# Patient Record
Sex: MALE | Race: WHITE | Employment: FULL TIME | URBAN - METROPOLITAN AREA
[De-identification: names, ages, dates, MRNs, and addresses within clinical notes are randomized per-mention and may not be internally consistent; named-entity substitution may affect disease eponyms.]

---

## 2017-04-25 ENCOUNTER — HOSPITAL ENCOUNTER (OUTPATIENT)
Age: 47
Discharge: HOME | End: 2017-04-25
Payer: COMMERCIAL

## 2017-04-25 VITALS
WEIGHT: 275 LBS | RESPIRATION RATE: 18 BRPM | SYSTOLIC BLOOD PRESSURE: 139 MMHG | HEIGHT: 71 IN | HEART RATE: 72 BPM | OXYGEN SATURATION: 98 % | TEMPERATURE: 98 F | BODY MASS INDEX: 38.5 KG/M2 | DIASTOLIC BLOOD PRESSURE: 95 MMHG

## 2017-04-25 DIAGNOSIS — H57.9 SENSATION OF FOREIGN BODY IN EYE: Primary | ICD-10-CM

## 2017-04-25 DIAGNOSIS — H10.30 ACUTE CONJUNCTIVITIS, UNSPECIFIED ACUTE CONJUNCTIVITIS TYPE, UNSPECIFIED LATERALITY: ICD-10-CM

## 2017-04-25 PROCEDURE — 99204 OFFICE O/P NEW MOD 45 MIN: CPT

## 2017-04-25 PROCEDURE — 99203 OFFICE O/P NEW LOW 30 MIN: CPT

## 2017-04-25 PROCEDURE — 99213 OFFICE O/P EST LOW 20 MIN: CPT

## 2017-04-25 RX ORDER — TOBRAMYCIN 3 MG/ML
1 SOLUTION/ DROPS OPHTHALMIC EVERY 6 HOURS
Qty: 1 BOTTLE | Refills: 0 | Status: SHIPPED | OUTPATIENT
Start: 2017-04-25 | End: 2017-04-30

## 2017-04-28 NOTE — ED PROVIDER NOTES
Patient presents with: Eye Visual Problem (opthalmic)      HPI:     Dave Corley is a 55year old male who presents today with a chief complaint of pink eye. Onset of symptoms was abrupt starting 2 days ago.    Pain is described as itching, irritation unspecified acute conjunctivitis type, unspecified laterality H10.30     use tobramycin eyedrops as directed. Follow-up with primary care physician as needed.   If has any worsening redness, blurred vision or change in vision recommended to follow-up with

## 2017-05-31 ENCOUNTER — OFFICE VISIT (OUTPATIENT)
Dept: INTERNAL MEDICINE CLINIC | Facility: CLINIC | Age: 47
End: 2017-05-31

## 2017-05-31 VITALS
BODY MASS INDEX: 40.37 KG/M2 | HEIGHT: 70 IN | RESPIRATION RATE: 13 BRPM | HEART RATE: 104 BPM | OXYGEN SATURATION: 95 % | DIASTOLIC BLOOD PRESSURE: 86 MMHG | SYSTOLIC BLOOD PRESSURE: 124 MMHG | WEIGHT: 282 LBS | TEMPERATURE: 98 F

## 2017-05-31 DIAGNOSIS — Z00.00 PREVENTATIVE HEALTH CARE: Primary | ICD-10-CM

## 2017-05-31 PROBLEM — E66.01 MORBID OBESITY (HCC): Status: ACTIVE | Noted: 2017-05-31

## 2017-05-31 PROCEDURE — 99386 PREV VISIT NEW AGE 40-64: CPT | Performed by: INTERNAL MEDICINE

## 2017-05-31 PROCEDURE — 90471 IMMUNIZATION ADMIN: CPT | Performed by: INTERNAL MEDICINE

## 2017-05-31 PROCEDURE — 90715 TDAP VACCINE 7 YRS/> IM: CPT | Performed by: INTERNAL MEDICINE

## 2017-05-31 NOTE — PATIENT INSTRUCTIONS
- Get fasting blood work done (water and medications only for 8 hours). - Your blood pressure was fine today. - We will give you your tetanus shot today, which will cover you for 10 years.         Eating Heart-Healthy Food: Using the [address] [place] [address] Best choices: Skim or 1% milk, low-fat or fat-free yogurt or buttermilk, and low-fat cheeses.         Lean meats, poultry, fish  Servings: 6 or fewer a day  A serving is:  · 1 ounce cooked meats, poultry, or fish  · 1 egg  Best choices: Lean poultry and fi

## 2017-06-03 ENCOUNTER — LAB ENCOUNTER (OUTPATIENT)
Dept: LAB | Facility: HOSPITAL | Age: 47
End: 2017-06-03
Attending: INTERNAL MEDICINE
Payer: COMMERCIAL

## 2017-06-03 DIAGNOSIS — Z00.00 PREVENTATIVE HEALTH CARE: ICD-10-CM

## 2017-06-03 PROCEDURE — 80053 COMPREHEN METABOLIC PANEL: CPT

## 2017-06-03 PROCEDURE — 85025 COMPLETE CBC W/AUTO DIFF WBC: CPT

## 2017-06-03 PROCEDURE — 36415 COLL VENOUS BLD VENIPUNCTURE: CPT

## 2017-06-03 PROCEDURE — 80061 LIPID PANEL: CPT

## 2017-06-03 PROCEDURE — 83036 HEMOGLOBIN GLYCOSYLATED A1C: CPT

## 2017-06-05 PROBLEM — E78.1 HYPERTRIGLYCERIDEMIA: Status: ACTIVE | Noted: 2017-06-05

## 2017-06-05 PROBLEM — R73.03 PREDIABETES: Status: ACTIVE | Noted: 2017-06-05

## 2017-07-26 ENCOUNTER — OFFICE VISIT (OUTPATIENT)
Dept: INTERNAL MEDICINE CLINIC | Facility: CLINIC | Age: 47
End: 2017-07-26

## 2017-07-26 VITALS
DIASTOLIC BLOOD PRESSURE: 80 MMHG | RESPIRATION RATE: 12 BRPM | SYSTOLIC BLOOD PRESSURE: 120 MMHG | WEIGHT: 279 LBS | HEIGHT: 70 IN | TEMPERATURE: 98 F | HEART RATE: 18 BPM | BODY MASS INDEX: 39.94 KG/M2

## 2017-07-26 DIAGNOSIS — R80.0 ISOLATED PROTEINURIA WITHOUT SPECIFIC MORPHOLOGIC LESION: Primary | ICD-10-CM

## 2017-07-26 DIAGNOSIS — G47.33 OBSTRUCTIVE SLEEP APNEA: ICD-10-CM

## 2017-07-26 PROCEDURE — 99213 OFFICE O/P EST LOW 20 MIN: CPT | Performed by: INTERNAL MEDICINE

## 2017-07-26 NOTE — PROGRESS NOTES
Katie Hogan is a 52year old male. HPI:   Patient presents with:  Test Results: need sleep study order  Patient presents to discuss several issues. He had a DOT physical recently. UA showed mild proteinuria.    Additionally, patient requires an well nourished,in no apparent distress  HEENT: atraumatic, PERRLA, EOMI, normal lid and conjunctiva  LUNGS: clear to auscultation bilaterally, no wheezing/rubs  CARDIO: RRR without murmurs. No clubbing, cyanosis or edema.   GI: soft non tender nondistended

## 2017-07-26 NOTE — PATIENT INSTRUCTIONS
- Call to schedule sleep study  - Get urine protein test done when you have time. It was a pleasure seeing you in the clinic today. Thank you for choosing the [facility] office for your healthcare needs.  Please call at [phone]

## 2017-09-15 ENCOUNTER — OFFICE VISIT (OUTPATIENT)
Dept: SLEEP CENTER | Facility: HOSPITAL | Age: 47
End: 2017-09-15
Attending: INTERNAL MEDICINE
Payer: COMMERCIAL

## 2017-09-15 PROCEDURE — 95810 POLYSOM 6/> YRS 4/> PARAM: CPT

## 2017-09-19 NOTE — PROCEDURES
[address] [address],[facility] [address]       Accredited by the [facility] of Sleep Medicine (AASM)    PATIENT'S NAME:        Shaylee Gonzalez PHYSICIAN:   Amaya Villa M.D.   REFERRING PHYSICIAN:   Anabella Mena MD  CHERELLE noted.  The oxygen robbi was 83%. The patient spent 95% of the record with a saturation of greater than 90%. PERIODIC LIMB MOVEMENTS:  PLM index is 10 with a PLM arousal index of 5.     EEG:  With the limited recording montage, no EEG abnormalities were

## 2017-09-22 ENCOUNTER — OFFICE VISIT (OUTPATIENT)
Dept: INTERNAL MEDICINE CLINIC | Facility: CLINIC | Age: 47
End: 2017-09-22

## 2017-09-22 VITALS
OXYGEN SATURATION: 93 % | DIASTOLIC BLOOD PRESSURE: 84 MMHG | TEMPERATURE: 99 F | HEIGHT: 70 IN | SYSTOLIC BLOOD PRESSURE: 134 MMHG | WEIGHT: 284 LBS | BODY MASS INDEX: 40.66 KG/M2 | HEART RATE: 86 BPM | RESPIRATION RATE: 12 BRPM

## 2017-09-22 DIAGNOSIS — E78.1 HYPERTRIGLYCERIDEMIA: ICD-10-CM

## 2017-09-22 DIAGNOSIS — R73.03 PREDIABETES: ICD-10-CM

## 2017-09-22 DIAGNOSIS — G47.33 OBSTRUCTIVE SLEEP APNEA: Primary | ICD-10-CM

## 2017-09-22 DIAGNOSIS — E66.01 MORBID OBESITY (HCC): ICD-10-CM

## 2017-09-22 PROCEDURE — 90686 IIV4 VACC NO PRSV 0.5 ML IM: CPT | Performed by: INTERNAL MEDICINE

## 2017-09-22 PROCEDURE — 99214 OFFICE O/P EST MOD 30 MIN: CPT | Performed by: INTERNAL MEDICINE

## 2017-09-22 PROCEDURE — 90471 IMMUNIZATION ADMIN: CPT | Performed by: INTERNAL MEDICINE

## 2017-09-22 RX ORDER — BENZONATATE 200 MG/1
200 CAPSULE ORAL 3 TIMES DAILY PRN
Qty: 30 CAPSULE | Refills: 1 | Status: SHIPPED | OUTPATIENT
Start: 2017-09-22 | End: 2017-11-26

## 2017-09-22 NOTE — PATIENT INSTRUCTIONS
- Check with Dr. Kiki Taylor office about whether they need to see you sooner for paperwork  - Start nightly allergy medication.   Options are cetirizine, loratadine, or fexofenadine.  - Start twice daily nasal sprays such as fluticasone, mometasone, Flonase

## 2017-09-23 PROBLEM — G47.33 OBSTRUCTIVE SLEEP APNEA: Status: ACTIVE | Noted: 2017-09-23

## 2017-11-02 ENCOUNTER — TELEPHONE (OUTPATIENT)
Dept: INTERNAL MEDICINE CLINIC | Facility: CLINIC | Age: 47
End: 2017-11-02

## 2017-11-02 NOTE — TELEPHONE ENCOUNTER
Patient called to reschedule appointment on same day. Patient was informed that it will be a no show appointment and was also explained no show policy. Patient understood policy.

## 2017-11-06 ENCOUNTER — OFFICE VISIT (OUTPATIENT)
Dept: INTERNAL MEDICINE CLINIC | Facility: CLINIC | Age: 47
End: 2017-11-06

## 2017-11-06 VITALS
OXYGEN SATURATION: 98 % | HEIGHT: 71 IN | RESPIRATION RATE: 16 BRPM | DIASTOLIC BLOOD PRESSURE: 72 MMHG | WEIGHT: 292.5 LBS | SYSTOLIC BLOOD PRESSURE: 122 MMHG | HEART RATE: 72 BPM | TEMPERATURE: 98 F | BODY MASS INDEX: 40.95 KG/M2

## 2017-11-06 DIAGNOSIS — E78.1 HYPERTRIGLYCERIDEMIA: ICD-10-CM

## 2017-11-06 DIAGNOSIS — G47.33 OBSTRUCTIVE SLEEP APNEA: ICD-10-CM

## 2017-11-06 DIAGNOSIS — E66.01 MORBID OBESITY (HCC): ICD-10-CM

## 2017-11-06 DIAGNOSIS — R73.03 PREDIABETES: ICD-10-CM

## 2017-11-06 DIAGNOSIS — R05.3 PERSISTENT COUGH: Primary | ICD-10-CM

## 2017-11-06 PROCEDURE — 99214 OFFICE O/P EST MOD 30 MIN: CPT | Performed by: INTERNAL MEDICINE

## 2017-11-06 RX ORDER — CODEINE PHOSPHATE AND GUAIFENESIN 10; 100 MG/5ML; MG/5ML
5 SOLUTION ORAL NIGHTLY PRN
Qty: 118 ML | Refills: 0 | Status: SHIPPED | OUTPATIENT
Start: 2017-11-06 | End: 2017-11-26

## 2017-11-06 NOTE — PATIENT INSTRUCTIONS
- Use codeine cough syrup (at night only). - Continue benzonatate during the day  - Also use steroid nasal sprays such as fluticasone, mometasone, Flonase, Nasocort, Nasonex, Rhinocort.   - Consider OTC allergy medications - loratadine, cetirizine, fexofen

## 2017-11-07 NOTE — PROGRESS NOTES
Kacey Mcqueen is a 52year old male. HPI:   Patient presents with:  Cough: x1 month, little phlegm white  Patient presents for follow up on persistent cough. Has been going on for past two months.   Tried OTC antihistamines and steroid nasal sprays, 282 lb  09/22/17 : 284 lb  07/26/17 : 279 lb  05/31/17 : 282 lb  04/25/17 : 275 lb    EXAM:   /72 (BP Location: Left arm, Patient Position: Sitting, Cuff Size: large)   Pulse 72   Temp 98.1 °F (36.7 °C) (Oral)   Resp 16   Ht 71\"   Wt 292 lb 8 oz   S earlier if acute issues arise.     Anabella Mena MD

## 2017-11-28 RX ORDER — BENZONATATE 200 MG/1
200 CAPSULE ORAL 3 TIMES DAILY PRN
Qty: 30 CAPSULE | Refills: 1 | Status: SHIPPED
Start: 2017-11-28 | End: 2018-11-29 | Stop reason: ALTCHOICE

## 2017-11-28 NOTE — TELEPHONE ENCOUNTER
From: Jeff Lacey  Sent: 11/26/2017 8:55 PM CST  Subject: Medication Renewal Request    Jeff Lacey would like a refill of the following medications:     guaiFENesin-codeine 100-10 MG/5ML Oral Solution Aníbal Olsen MD]    Preferred pharmacy:

## 2017-11-28 NOTE — TELEPHONE ENCOUNTER
From: Tio Maddox  Sent: 11/26/2017 8:55 PM CST  Subject: Medication Renewal Request    Tio Maddox would like a refill of the following medications:     benzonatate 200 MG Oral Cap Frida Moralez MD]    Preferred pharmacy: [place] [id number]

## 2017-11-29 RX ORDER — CODEINE PHOSPHATE AND GUAIFENESIN 10; 100 MG/5ML; MG/5ML
5 SOLUTION ORAL NIGHTLY PRN
Qty: 118 ML | Refills: 0
Start: 2017-11-29

## 2017-11-29 RX ORDER — CODEINE PHOSPHATE AND GUAIFENESIN 10; 100 MG/5ML; MG/5ML
5 SOLUTION ORAL NIGHTLY PRN
Qty: 118 ML | Refills: 0 | Status: SHIPPED | OUTPATIENT
Start: 2017-11-29 | End: 2018-11-29 | Stop reason: ALTCHOICE

## 2017-11-29 RX ORDER — CODEINE PHOSPHATE AND GUAIFENESIN 10; 100 MG/5ML; MG/5ML
5 SOLUTION ORAL NIGHTLY PRN
Qty: 118 ML | Refills: 0
Start: 2017-11-29 | End: 2017-11-29

## 2017-11-29 RX ORDER — BENZONATATE 200 MG/1
200 CAPSULE ORAL 3 TIMES DAILY PRN
Qty: 30 CAPSULE | Refills: 1
Start: 2017-11-29

## 2018-11-29 ENCOUNTER — OFFICE VISIT (OUTPATIENT)
Dept: INTERNAL MEDICINE CLINIC | Facility: CLINIC | Age: 48
End: 2018-11-29
Payer: COMMERCIAL

## 2018-11-29 VITALS
RESPIRATION RATE: 16 BRPM | DIASTOLIC BLOOD PRESSURE: 78 MMHG | HEIGHT: 70 IN | TEMPERATURE: 98 F | BODY MASS INDEX: 41.95 KG/M2 | HEART RATE: 77 BPM | WEIGHT: 293 LBS | OXYGEN SATURATION: 97 % | SYSTOLIC BLOOD PRESSURE: 130 MMHG

## 2018-11-29 DIAGNOSIS — M25.561 ACUTE PAIN OF RIGHT KNEE: Primary | ICD-10-CM

## 2018-11-29 DIAGNOSIS — Z23 NEED FOR IMMUNIZATION AGAINST INFLUENZA: ICD-10-CM

## 2018-11-29 PROCEDURE — 90686 IIV4 VACC NO PRSV 0.5 ML IM: CPT | Performed by: INTERNAL MEDICINE

## 2018-11-29 PROCEDURE — 99214 OFFICE O/P EST MOD 30 MIN: CPT | Performed by: INTERNAL MEDICINE

## 2018-11-29 PROCEDURE — 90471 IMMUNIZATION ADMIN: CPT | Performed by: INTERNAL MEDICINE

## 2018-11-29 NOTE — PROGRESS NOTES
Samanta Tay is a 50year old male. HPI:   Patient presents with:  Knee Pain: right knee pain - pt concerned may be gout related  Patient presents with an acute musculoskeletal complaint. Patient has been dealing with pain in right knee.   It has bee Position: Sitting, Cuff Size: adult)   Pulse 77   Temp 98.4 °F (36.9 °C) (Oral)   Resp 16   Ht 70\"   Wt 293 lb   SpO2 97%   BMI 42.04 kg/m²   GENERAL: Alert and oriented, well developed, well nourished,in no apparent distress  HEENT: atraumatic, MARIA DEL CARMEN CORNEJO

## 2018-11-29 NOTE — PATIENT INSTRUCTIONS
- Start diclofenac (prescription anti-inflammatory). Take 1 tablet every 8 hours as needed. Take with food. Do not take any other over the counter anti-inflammatories while you are taking diclofenac.   Ok to take Tylenol.  - Follow up with me soon for yo

## 2018-12-13 ENCOUNTER — LAB ENCOUNTER (OUTPATIENT)
Dept: LAB | Age: 48
End: 2018-12-13
Attending: INTERNAL MEDICINE
Payer: COMMERCIAL

## 2018-12-13 ENCOUNTER — OFFICE VISIT (OUTPATIENT)
Dept: INTERNAL MEDICINE CLINIC | Facility: CLINIC | Age: 48
End: 2018-12-13
Payer: COMMERCIAL

## 2018-12-13 VITALS
TEMPERATURE: 98 F | BODY MASS INDEX: 41.77 KG/M2 | RESPIRATION RATE: 16 BRPM | WEIGHT: 291.75 LBS | HEIGHT: 70 IN | HEART RATE: 68 BPM | SYSTOLIC BLOOD PRESSURE: 134 MMHG | DIASTOLIC BLOOD PRESSURE: 80 MMHG

## 2018-12-13 DIAGNOSIS — M25.561 ACUTE PAIN OF RIGHT KNEE: ICD-10-CM

## 2018-12-13 DIAGNOSIS — Z00.00 ENCOUNTER FOR SCREENING AND PREVENTATIVE CARE: ICD-10-CM

## 2018-12-13 DIAGNOSIS — Z00.00 ENCOUNTER FOR SCREENING AND PREVENTATIVE CARE: Primary | ICD-10-CM

## 2018-12-13 PROCEDURE — 84153 ASSAY OF PSA TOTAL: CPT | Performed by: INTERNAL MEDICINE

## 2018-12-13 PROCEDURE — 80050 GENERAL HEALTH PANEL: CPT | Performed by: INTERNAL MEDICINE

## 2018-12-13 PROCEDURE — 83036 HEMOGLOBIN GLYCOSYLATED A1C: CPT | Performed by: INTERNAL MEDICINE

## 2018-12-13 PROCEDURE — 80061 LIPID PANEL: CPT | Performed by: INTERNAL MEDICINE

## 2018-12-13 PROCEDURE — 99396 PREV VISIT EST AGE 40-64: CPT | Performed by: INTERNAL MEDICINE

## 2018-12-13 PROCEDURE — 36415 COLL VENOUS BLD VENIPUNCTURE: CPT | Performed by: INTERNAL MEDICINE

## 2018-12-13 NOTE — PROGRESS NOTES
Tio Maddox is a 50year old male. HPI:   Patient presents with:  Physical: Fasting   Knee Pain: Follow up  Patient presents for CPX/wellness examination. Diet: Not the healthiest  Exercise: Not a lot. A lot of physical activity at work.     Vision oz  11/29/18 : 293 lb  11/06/17 : 292 lb 8 oz  10/04/17 : 282 lb  09/22/17 : 284 lb  07/26/17 : 279 lb    EXAM:   /80 (BP Location: Left arm, Patient Position: Sitting, Cuff Size: adult)   Pulse 68   Temp 98.2 °F (36.8 °C) (Oral)   Resp 16   Ht 70\"

## 2018-12-13 NOTE — PATIENT INSTRUCTIONS
- Get blood work done today  - Continue with the diclofenac for the knee pain.   If you need a refill on the diclofenac, send a request on [employer].   - Start knee exercises as shown on handout  - If your knee pain is not better within a month or so, come ba

## 2018-12-14 DIAGNOSIS — E78.2 MIXED HYPERLIPIDEMIA: Primary | ICD-10-CM

## 2018-12-14 DIAGNOSIS — R73.03 PREDIABETES: ICD-10-CM

## 2019-01-16 ENCOUNTER — PATIENT MESSAGE (OUTPATIENT)
Dept: INTERNAL MEDICINE CLINIC | Facility: CLINIC | Age: 49
End: 2019-01-16

## 2019-01-16 ENCOUNTER — OFFICE VISIT (OUTPATIENT)
Dept: INTERNAL MEDICINE CLINIC | Facility: CLINIC | Age: 49
End: 2019-01-16
Payer: COMMERCIAL

## 2019-01-16 VITALS
HEIGHT: 70 IN | HEART RATE: 60 BPM | TEMPERATURE: 99 F | WEIGHT: 297 LBS | RESPIRATION RATE: 12 BRPM | SYSTOLIC BLOOD PRESSURE: 150 MMHG | DIASTOLIC BLOOD PRESSURE: 96 MMHG | BODY MASS INDEX: 42.52 KG/M2

## 2019-01-16 DIAGNOSIS — E78.2 MIXED HYPERLIPIDEMIA: ICD-10-CM

## 2019-01-16 DIAGNOSIS — G47.33 OBSTRUCTIVE SLEEP APNEA: ICD-10-CM

## 2019-01-16 DIAGNOSIS — R09.81 NASAL CONGESTION: ICD-10-CM

## 2019-01-16 DIAGNOSIS — M25.50 CHRONIC JOINT PAIN: ICD-10-CM

## 2019-01-16 DIAGNOSIS — G89.29 CHRONIC JOINT PAIN: ICD-10-CM

## 2019-01-16 DIAGNOSIS — R73.03 PREDIABETES: ICD-10-CM

## 2019-01-16 DIAGNOSIS — M25.512 ACUTE PAIN OF LEFT SHOULDER: ICD-10-CM

## 2019-01-16 DIAGNOSIS — R06.02 SHORTNESS OF BREATH: Primary | ICD-10-CM

## 2019-01-16 PROCEDURE — 99214 OFFICE O/P EST MOD 30 MIN: CPT | Performed by: INTERNAL MEDICINE

## 2019-01-16 NOTE — PATIENT INSTRUCTIONS
- Start nightly allergy medication. Over the counter options are cetirizine, loratadine, fexofenadine.  - Also use steroid nasal sprays such as fluticasone, mometasone, Flonase, Nasocort, Nasonex, Rhinocort.   Use twice daily for the next week  - Let us kn

## 2019-01-16 NOTE — TELEPHONE ENCOUNTER
From: Nereyda Veronica  To: Aliyah Campbell MD  Sent: 1/16/2019 10:28 AM CST  Subject: Other    Hey Doc,  I had some shortness of breath last night around 2:00 am, couldn't get a full breath in. Don't know if it's anything serious but little concerned.  Ar

## 2019-01-16 NOTE — TELEPHONE ENCOUNTER
Pt contacted and  requesting appointment today with Dr Wade Crowell. Pt c/o episode of  sob and unable to take a deep breath at 2 am this morning. No c/o cough, wheezing or acute illness. Pt better this morning but still feels alittle short of breath.   Can y

## 2019-01-16 NOTE — PROGRESS NOTES
Andrew Kirby is a 50year old male. HPI:   Patient presents with:  Shortness Of Breath: Pt woke up last night with shortness of  breath. States he also had some left shoulder pain.    Shoulder Pain: Left side   Patient presents   Woke up last jc wi alcohol. He reports that he does not use drugs.   Wt Readings from Last 6 Encounters:  01/16/19 : 297 lb  12/13/18 : 291 lb 12 oz  11/29/18 : 293 lb  11/06/17 : 292 lb 8 oz  10/04/17 : 282 lb  09/22/17 : 284 lb    EXAM:   BP (!) 150/96 (BP Location: Left ar

## 2019-06-12 PROCEDURE — 87086 URINE CULTURE/COLONY COUNT: CPT | Performed by: EMERGENCY MEDICINE

## 2019-06-14 ENCOUNTER — OFFICE VISIT (OUTPATIENT)
Dept: INTERNAL MEDICINE CLINIC | Facility: CLINIC | Age: 49
End: 2019-06-14
Payer: COMMERCIAL

## 2019-06-14 ENCOUNTER — APPOINTMENT (OUTPATIENT)
Dept: LAB | Age: 49
End: 2019-06-14
Attending: INTERNAL MEDICINE
Payer: COMMERCIAL

## 2019-06-14 VITALS
RESPIRATION RATE: 24 BRPM | HEART RATE: 80 BPM | TEMPERATURE: 99 F | BODY MASS INDEX: 42 KG/M2 | WEIGHT: 291 LBS | DIASTOLIC BLOOD PRESSURE: 86 MMHG | SYSTOLIC BLOOD PRESSURE: 120 MMHG

## 2019-06-14 DIAGNOSIS — R73.03 PREDIABETES: Chronic | ICD-10-CM

## 2019-06-14 DIAGNOSIS — E78.2 MIXED HYPERLIPIDEMIA: Chronic | ICD-10-CM

## 2019-06-14 DIAGNOSIS — R09.81 NASAL CONGESTION: ICD-10-CM

## 2019-06-14 DIAGNOSIS — R73.03 PREDIABETES: ICD-10-CM

## 2019-06-14 DIAGNOSIS — K57.32 SIGMOID DIVERTICULITIS: Primary | ICD-10-CM

## 2019-06-14 DIAGNOSIS — G47.33 OBSTRUCTIVE SLEEP APNEA: Chronic | ICD-10-CM

## 2019-06-14 PROCEDURE — 99214 OFFICE O/P EST MOD 30 MIN: CPT | Performed by: INTERNAL MEDICINE

## 2019-06-14 PROCEDURE — 36415 COLL VENOUS BLD VENIPUNCTURE: CPT | Performed by: INTERNAL MEDICINE

## 2019-06-14 PROCEDURE — 83036 HEMOGLOBIN GLYCOSYLATED A1C: CPT | Performed by: INTERNAL MEDICINE

## 2019-06-14 RX ORDER — TRAMADOL HYDROCHLORIDE 50 MG/1
50 TABLET ORAL EVERY 8 HOURS PRN
Qty: 30 TABLET | Refills: 0 | Status: SHIPPED | OUTPATIENT
Start: 2019-06-14 | End: 2020-09-28

## 2019-06-14 NOTE — PROGRESS NOTES
Donn Lyles is a 52year old male. HPI:   Patient presents with: Follow - Up: per pt A1C blood draw done today  Headache: frontal  Stuffy Nose    Patient presents to discuss several issues.   He was recently at the immediate care with complaints of (2010) and tonsillectomy (2010). Family: family history includes Cancer in his mother; Diabetes in his father; Hypertension in his father. Social:  reports that he quit smoking about 21 years ago. His smoking use included cigarettes and cigars.  He has ne General (Internal Medicine)  The patient indicates understanding of these issues and agrees to the plan. The patient is asked to return to clinic in 6-12 months with myself for follow up on chronic issues, or earlier if acute issues arise.     Tara Fan

## 2019-06-14 NOTE — PATIENT INSTRUCTIONS
- Slowly advance diet as tolerated  - Take tramadol every 8 hours as needed for pain  - Off work until next Saturday  - Call or go to immediate care/emergency department with any fevers/chills, worsening abdominal pain, nausea/vomiting, blood in stool.

## 2019-06-18 PROBLEM — G47.33 OBSTRUCTIVE SLEEP APNEA: Chronic | Status: ACTIVE | Noted: 2017-09-23

## 2019-06-18 PROBLEM — E78.2 MIXED HYPERLIPIDEMIA: Chronic | Status: ACTIVE | Noted: 2017-06-05

## 2019-06-18 PROBLEM — R73.03 PREDIABETES: Chronic | Status: ACTIVE | Noted: 2017-06-05

## 2019-06-18 PROBLEM — K57.32 SIGMOID DIVERTICULITIS: Status: ACTIVE | Noted: 2019-06-18

## 2020-01-25 RX ORDER — TRAMADOL HYDROCHLORIDE 50 MG/1
50 TABLET ORAL EVERY 8 HOURS PRN
Qty: 30 TABLET | Refills: 0 | Status: CANCELLED | OUTPATIENT
Start: 2020-01-25

## 2020-01-27 RX ORDER — TRAMADOL HYDROCHLORIDE 50 MG/1
50 TABLET ORAL EVERY 8 HOURS PRN
Qty: 30 TABLET | Refills: 0 | Status: CANCELLED | OUTPATIENT
Start: 2020-01-27

## 2020-01-27 NOTE — TELEPHONE ENCOUNTER
Failed protocol     Last refill:  6/14/2019 Tramadol 50 mg #30 tablets NR    LOV:   6/14/2019 Dr Xiao Rossi RTC 6-12 months  No FOV scheduled

## 2020-01-27 NOTE — TELEPHONE ENCOUNTER
Looks like I prescribed this in the summer for his diverticulitis related pain/groin pain - is he still having this pain?

## 2020-02-20 ENCOUNTER — APPOINTMENT (OUTPATIENT)
Dept: LAB | Age: 50
End: 2020-02-20
Attending: INTERNAL MEDICINE
Payer: COMMERCIAL

## 2020-02-20 ENCOUNTER — OFFICE VISIT (OUTPATIENT)
Dept: INTERNAL MEDICINE CLINIC | Facility: CLINIC | Age: 50
End: 2020-02-20
Payer: COMMERCIAL

## 2020-02-20 VITALS
TEMPERATURE: 98 F | SYSTOLIC BLOOD PRESSURE: 130 MMHG | RESPIRATION RATE: 16 BRPM | OXYGEN SATURATION: 98 % | HEART RATE: 88 BPM | HEIGHT: 70 IN | DIASTOLIC BLOOD PRESSURE: 70 MMHG | BODY MASS INDEX: 42.66 KG/M2 | WEIGHT: 298 LBS

## 2020-02-20 DIAGNOSIS — Z12.5 SCREENING FOR MALIGNANT NEOPLASM OF PROSTATE: ICD-10-CM

## 2020-02-20 DIAGNOSIS — R73.03 PREDIABETES: Chronic | ICD-10-CM

## 2020-02-20 DIAGNOSIS — J40 BRONCHITIS: ICD-10-CM

## 2020-02-20 DIAGNOSIS — Z12.11 SCREENING FOR MALIGNANT NEOPLASM OF COLON: ICD-10-CM

## 2020-02-20 DIAGNOSIS — R74.8 ELEVATED LIVER ENZYMES: ICD-10-CM

## 2020-02-20 DIAGNOSIS — E66.01 MORBID OBESITY (HCC): ICD-10-CM

## 2020-02-20 DIAGNOSIS — Z00.00 ENCOUNTER FOR PREVENTATIVE ADULT HEALTH CARE EXAMINATION: Primary | ICD-10-CM

## 2020-02-20 DIAGNOSIS — Z00.00 ENCOUNTER FOR PREVENTATIVE ADULT HEALTH CARE EXAMINATION: ICD-10-CM

## 2020-02-20 DIAGNOSIS — G47.33 OBSTRUCTIVE SLEEP APNEA: Chronic | ICD-10-CM

## 2020-02-20 DIAGNOSIS — E78.2 MIXED HYPERLIPIDEMIA: Chronic | ICD-10-CM

## 2020-02-20 LAB
ALBUMIN SERPL-MCNC: 3.8 G/DL (ref 3.4–5)
ALBUMIN/GLOB SERPL: 0.9 {RATIO} (ref 1–2)
ALP LIVER SERPL-CCNC: 64 U/L (ref 45–117)
ALT SERPL-CCNC: 79 U/L (ref 16–61)
ANION GAP SERPL CALC-SCNC: 5 MMOL/L (ref 0–18)
AST SERPL-CCNC: 38 U/L (ref 15–37)
BILIRUB SERPL-MCNC: 0.8 MG/DL (ref 0.1–2)
BUN BLD-MCNC: 13 MG/DL (ref 7–18)
BUN/CREAT SERPL: 13 (ref 10–20)
CALCIUM BLD-MCNC: 9.3 MG/DL (ref 8.5–10.1)
CHLORIDE SERPL-SCNC: 105 MMOL/L (ref 98–112)
CHOLEST SMN-MCNC: 188 MG/DL (ref ?–200)
CO2 SERPL-SCNC: 27 MMOL/L (ref 21–32)
COMPLEXED PSA SERPL-MCNC: 0.69 NG/ML (ref ?–4)
CREAT BLD-MCNC: 1 MG/DL (ref 0.7–1.3)
EST. AVERAGE GLUCOSE BLD GHB EST-MCNC: 140 MG/DL (ref 68–126)
GLOBULIN PLAS-MCNC: 4.1 G/DL (ref 2.8–4.4)
GLUCOSE BLD-MCNC: 98 MG/DL (ref 70–99)
HBA1C MFR BLD HPLC: 6.5 % (ref ?–5.7)
HDLC SERPL-MCNC: 38 MG/DL (ref 40–59)
LDLC SERPL CALC-MCNC: 113 MG/DL (ref ?–100)
M PROTEIN MFR SERPL ELPH: 7.9 G/DL (ref 6.4–8.2)
NONHDLC SERPL-MCNC: 150 MG/DL (ref ?–130)
OSMOLALITY SERPL CALC.SUM OF ELEC: 284 MOSM/KG (ref 275–295)
PATIENT FASTING Y/N/NP: YES
PATIENT FASTING Y/N/NP: YES
POTASSIUM SERPL-SCNC: 4.4 MMOL/L (ref 3.5–5.1)
SODIUM SERPL-SCNC: 137 MMOL/L (ref 136–145)
TRIGL SERPL-MCNC: 184 MG/DL (ref 30–149)
TSI SER-ACNC: 1.99 MIU/ML (ref 0.36–3.74)
VLDLC SERPL CALC-MCNC: 37 MG/DL (ref 0–30)

## 2020-02-20 PROCEDURE — 80061 LIPID PANEL: CPT | Performed by: INTERNAL MEDICINE

## 2020-02-20 PROCEDURE — 80053 COMPREHEN METABOLIC PANEL: CPT | Performed by: INTERNAL MEDICINE

## 2020-02-20 PROCEDURE — 83036 HEMOGLOBIN GLYCOSYLATED A1C: CPT | Performed by: INTERNAL MEDICINE

## 2020-02-20 PROCEDURE — 84153 ASSAY OF PSA TOTAL: CPT | Performed by: INTERNAL MEDICINE

## 2020-02-20 PROCEDURE — 36415 COLL VENOUS BLD VENIPUNCTURE: CPT | Performed by: INTERNAL MEDICINE

## 2020-02-20 PROCEDURE — 99396 PREV VISIT EST AGE 40-64: CPT | Performed by: INTERNAL MEDICINE

## 2020-02-20 PROCEDURE — 84443 ASSAY THYROID STIM HORMONE: CPT | Performed by: INTERNAL MEDICINE

## 2020-02-20 RX ORDER — BENZONATATE 100 MG/1
100 CAPSULE ORAL 3 TIMES DAILY PRN
Qty: 30 CAPSULE | Refills: 0 | Status: SHIPPED | OUTPATIENT
Start: 2020-02-20 | End: 2020-03-07

## 2020-02-20 NOTE — PATIENT INSTRUCTIONS
- Get blood work done today  - Continue focus on healthy diet and regular exercise  - Benzonatate capsules refilled.   Take 1 capsule 3 times daily as needed for cough  - You can stop the inhaler (as long as you are not having significant shortness of breat

## 2020-02-20 NOTE — PROGRESS NOTES
Tio Maddox is a 52year old male. HPI:   Patient presents with:  CPX    Patient presents for CPX/wellness examination. Diet: Cut down on sugar, soda. Exercise: Patient has been laid off for   Vision: Wears contacts.   Last eye exam was about a ye Obstructive apnea (09/15/2017) and Sigmoid diverticulitis (6/18/2019). Surgical:  has a past surgical history that includes adenoidectomy (2010) and tonsillectomy (2010).   Family: family history includes Cancer in his mother; Diabetes in his father; Hyper (14); Future  - HEMOGLOBIN A1C; Future  - LIPID PANEL; Future  - TSH W REFLEX TO FREE T4; Future  - PSA SCREEN; Future  - GASTRO - INTERNAL    4.  Bronchitis  Patient seen a week ago at a walk-in clinic - he was prescribed albuterol inhaler, benzonatate, pr

## 2020-02-21 DIAGNOSIS — R74.8 ELEVATED LIVER ENZYMES: ICD-10-CM

## 2020-02-21 DIAGNOSIS — R73.03 PREDIABETES: Chronic | ICD-10-CM

## 2020-02-21 DIAGNOSIS — E78.2 MIXED HYPERLIPIDEMIA: Primary | Chronic | ICD-10-CM

## 2020-02-23 PROBLEM — K57.32 SIGMOID DIVERTICULITIS: Status: RESOLVED | Noted: 2019-06-18 | Resolved: 2020-02-23

## 2020-02-27 DIAGNOSIS — J40 BRONCHITIS: ICD-10-CM

## 2020-02-28 RX ORDER — BENZONATATE 100 MG/1
100 CAPSULE ORAL 3 TIMES DAILY PRN
Qty: 30 CAPSULE | Refills: 0 | OUTPATIENT
Start: 2020-02-28

## 2020-03-07 DIAGNOSIS — J40 BRONCHITIS: ICD-10-CM

## 2020-03-09 DIAGNOSIS — R05.3 PERSISTENT COUGH FOR 3 WEEKS OR LONGER: Primary | ICD-10-CM

## 2020-03-09 RX ORDER — BENZONATATE 100 MG/1
100 CAPSULE ORAL 3 TIMES DAILY PRN
Qty: 30 CAPSULE | Refills: 0 | Status: SHIPPED | OUTPATIENT
Start: 2020-03-09 | End: 2020-09-28 | Stop reason: ALTCHOICE

## 2020-03-09 NOTE — TELEPHONE ENCOUNTER
Failed protocol     Last refill:  2/20/2020 Benzonatate 100 mg #30 NR    LOV:   2/20/2020 Dr Vonda Sanchez RTC 6-12 months  4. Bronchitis  Patient seen a week ago at a walk-in clinic - he was prescribed albuterol inhaler, benzonatate, prednisone.   Feeling better

## 2020-03-23 ENCOUNTER — HOSPITAL ENCOUNTER (OUTPATIENT)
Dept: GENERAL RADIOLOGY | Age: 50
Discharge: HOME | End: 2020-03-23
Attending: INTERNAL MEDICINE
Payer: COMMERCIAL

## 2020-03-23 DIAGNOSIS — R05.3 PERSISTENT COUGH FOR 3 WEEKS OR LONGER: ICD-10-CM

## 2020-03-23 PROCEDURE — 71046 X-RAY EXAM CHEST 2 VIEWS: CPT | Performed by: INTERNAL MEDICINE

## 2020-04-08 DIAGNOSIS — M25.50 CHRONIC JOINT PAIN: ICD-10-CM

## 2020-04-08 DIAGNOSIS — G89.29 CHRONIC JOINT PAIN: ICD-10-CM

## 2020-04-09 NOTE — TELEPHONE ENCOUNTER
Diclofenac 50 mg  Last OV relevant to medication: 2-20-20  Last refill date: 1-16-19 #/refills: 2  When pt was asked to return for OV: 6-12 mo.    Upcoming appt/reason: none  Recent labs: none

## 2020-09-28 ENCOUNTER — OFFICE VISIT (OUTPATIENT)
Dept: INTERNAL MEDICINE CLINIC | Facility: CLINIC | Age: 50
End: 2020-09-28
Payer: COMMERCIAL

## 2020-09-28 VITALS
TEMPERATURE: 98 F | HEIGHT: 70 IN | WEIGHT: 282.19 LBS | BODY MASS INDEX: 40.4 KG/M2 | HEART RATE: 60 BPM | SYSTOLIC BLOOD PRESSURE: 130 MMHG | DIASTOLIC BLOOD PRESSURE: 74 MMHG | RESPIRATION RATE: 16 BRPM

## 2020-09-28 DIAGNOSIS — G89.29 CHRONIC BILATERAL LOW BACK PAIN WITHOUT SCIATICA: ICD-10-CM

## 2020-09-28 DIAGNOSIS — R74.8 ELEVATED LIVER ENZYMES: ICD-10-CM

## 2020-09-28 DIAGNOSIS — G47.33 OBSTRUCTIVE SLEEP APNEA: Chronic | ICD-10-CM

## 2020-09-28 DIAGNOSIS — Z23 NEED FOR IMMUNIZATION AGAINST INFLUENZA: ICD-10-CM

## 2020-09-28 DIAGNOSIS — E78.2 MIXED HYPERLIPIDEMIA: Chronic | ICD-10-CM

## 2020-09-28 DIAGNOSIS — M54.50 CHRONIC BILATERAL LOW BACK PAIN WITHOUT SCIATICA: ICD-10-CM

## 2020-09-28 DIAGNOSIS — R03.0 ELEVATED BLOOD PRESSURE READING: Primary | ICD-10-CM

## 2020-09-28 DIAGNOSIS — R73.03 PREDIABETES: Chronic | ICD-10-CM

## 2020-09-28 PROCEDURE — 3008F BODY MASS INDEX DOCD: CPT | Performed by: INTERNAL MEDICINE

## 2020-09-28 PROCEDURE — 99214 OFFICE O/P EST MOD 30 MIN: CPT | Performed by: INTERNAL MEDICINE

## 2020-09-28 PROCEDURE — 3078F DIAST BP <80 MM HG: CPT | Performed by: INTERNAL MEDICINE

## 2020-09-28 PROCEDURE — 3075F SYST BP GE 130 - 139MM HG: CPT | Performed by: INTERNAL MEDICINE

## 2020-09-28 PROCEDURE — 90686 IIV4 VACC NO PRSV 0.5 ML IM: CPT | Performed by: INTERNAL MEDICINE

## 2020-09-28 PROCEDURE — 90471 IMMUNIZATION ADMIN: CPT | Performed by: INTERNAL MEDICINE

## 2020-09-28 RX ORDER — TRAMADOL HYDROCHLORIDE 50 MG/1
50 TABLET ORAL EVERY 8 HOURS PRN
Qty: 30 TABLET | Refills: 0 | Status: SHIPPED | OUTPATIENT
Start: 2020-09-28 | End: 2021-05-21

## 2020-09-28 NOTE — PROGRESS NOTES
Venita Perez is a 48year old male. HPI:   Patient presents with:  Blood Pressure: Pt states spouse checked BP last week and it was elevated. Around 150/100. This morning it was 140/100.   Health Maintenance: Pt will contact GI to schedule Colonoscopy adenoidectomy (2010) and tonsillectomy (2010). Family: family history includes Cancer in his mother; Diabetes in his father; Hypertension in his father. Social:  reports that he quit smoking about 22 years ago.  His smoking use included cigarettes and cig back pain without sciatica  Back pain flared up last week after he was moving some things. Tramadol refilled. - traMADol HCl 50 MG Oral Tab; Take 1 tablet (50 mg total) by mouth every 8 (eight) hours as needed for Pain. Dispense: 30 tablet;  Refill: 0

## 2020-09-28 NOTE — PATIENT INSTRUCTIONS
- Blood pressure is normal today in the office. I suspect your back pain was the reason for high blood pressure readings at home. We will monitor for now. Goal is <140/90 on a regular basis. - Get blood work done when fasting (8 hours).   You will need

## 2020-12-16 ENCOUNTER — TELEMEDICINE (OUTPATIENT)
Dept: INTERNAL MEDICINE CLINIC | Facility: CLINIC | Age: 50
End: 2020-12-16
Payer: COMMERCIAL

## 2020-12-16 ENCOUNTER — LAB ENCOUNTER (OUTPATIENT)
Dept: LAB | Age: 50
End: 2020-12-16
Attending: INTERNAL MEDICINE
Payer: COMMERCIAL

## 2020-12-16 DIAGNOSIS — B34.9 ACUTE VIRAL SYNDROME: ICD-10-CM

## 2020-12-16 DIAGNOSIS — B34.9 ACUTE VIRAL SYNDROME: Primary | ICD-10-CM

## 2020-12-16 PROCEDURE — 99213 OFFICE O/P EST LOW 20 MIN: CPT | Performed by: INTERNAL MEDICINE

## 2020-12-16 NOTE — PROGRESS NOTES
Samanta Tay is a 48year old male here today for a telemedicine/video visit via [facility]. Patient is in the state of [place] during this visit. Samanta Tay verbally consents to a Video visit service on 12/16/20.   Patient understands and accepts breath/respiratory symptoms.   - SARS-COV-2 BY PCR (); Future    Return to clinic in 3-6 months for follow up on chronic issues, or earlier as needed for acute issues.     Please note that the following visit was completed using two-way, real-time inte

## 2021-04-08 ENCOUNTER — IMMUNIZATION (OUTPATIENT)
Dept: LAB | Age: 51
End: 2021-04-08
Attending: HOSPITALIST
Payer: COMMERCIAL

## 2021-04-08 DIAGNOSIS — Z23 NEED FOR VACCINATION: Primary | ICD-10-CM

## 2021-04-08 PROCEDURE — 0001A SARSCOV2 VAC 30MCG/0.3ML IM: CPT

## 2021-05-02 ENCOUNTER — IMMUNIZATION (OUTPATIENT)
Dept: LAB | Age: 51
End: 2021-05-02
Attending: HOSPITALIST
Payer: COMMERCIAL

## 2021-05-02 DIAGNOSIS — Z23 NEED FOR VACCINATION: Primary | ICD-10-CM

## 2021-05-02 PROCEDURE — 0002A SARSCOV2 VAC 30MCG/0.3ML IM: CPT

## 2021-09-24 DIAGNOSIS — G89.29 CHRONIC BILATERAL LOW BACK PAIN WITHOUT SCIATICA: ICD-10-CM

## 2021-09-24 DIAGNOSIS — M54.50 CHRONIC BILATERAL LOW BACK PAIN WITHOUT SCIATICA: ICD-10-CM

## 2021-09-25 NOTE — TELEPHONE ENCOUNTER
No protocol     Last refill:  5/21/2021 Tramadol 50 mg #30 NR    Last telemedicine 12/16/2020 Dr Gonzalez Face   RTC 3-6 months   No FOV scheduled

## 2021-09-26 RX ORDER — TRAMADOL HYDROCHLORIDE 50 MG/1
50 TABLET ORAL EVERY 8 HOURS PRN
Qty: 30 TABLET | Refills: 0 | Status: SHIPPED | OUTPATIENT
Start: 2021-09-26 | End: 2022-01-10

## 2022-01-10 DIAGNOSIS — G89.29 CHRONIC BILATERAL LOW BACK PAIN WITHOUT SCIATICA: ICD-10-CM

## 2022-01-10 DIAGNOSIS — M54.50 CHRONIC BILATERAL LOW BACK PAIN WITHOUT SCIATICA: ICD-10-CM

## 2022-01-10 RX ORDER — TRAMADOL HYDROCHLORIDE 50 MG/1
50 TABLET ORAL EVERY 8 HOURS PRN
Qty: 30 TABLET | Refills: 0 | Status: SHIPPED | OUTPATIENT
Start: 2022-01-10

## 2022-01-10 NOTE — TELEPHONE ENCOUNTER
LOV: 12/16/2020 with Dr. Rod Ridley  RTC: 3-6 months  Last Relevant Labs: 2/20/2020  Filled: 9/26/2021    #30 with 0 refills    No future appointments.

## 2022-02-25 RX ORDER — TRAMADOL HYDROCHLORIDE 50 MG/1
50 TABLET ORAL EVERY 8 HOURS PRN
Qty: 30 TABLET | Refills: 0 | Status: SHIPPED | OUTPATIENT
Start: 2022-02-25 | End: 2022-03-28

## 2022-04-01 ENCOUNTER — ANESTHESIA (OUTPATIENT)
Dept: ENDOSCOPY | Facility: HOSPITAL | Age: 52
End: 2022-04-01
Payer: COMMERCIAL

## 2022-04-01 ENCOUNTER — HOSPITAL ENCOUNTER (OUTPATIENT)
Facility: HOSPITAL | Age: 52
Setting detail: HOSPITAL OUTPATIENT SURGERY
Discharge: HOME | End: 2022-04-01
Attending: INTERNAL MEDICINE | Admitting: INTERNAL MEDICINE
Payer: COMMERCIAL

## 2022-04-01 ENCOUNTER — ANESTHESIA EVENT (OUTPATIENT)
Dept: ENDOSCOPY | Facility: HOSPITAL | Age: 52
End: 2022-04-01
Payer: COMMERCIAL

## 2022-04-01 VITALS
TEMPERATURE: 98 F | OXYGEN SATURATION: 96 % | HEART RATE: 70 BPM | WEIGHT: 290 LBS | DIASTOLIC BLOOD PRESSURE: 76 MMHG | RESPIRATION RATE: 16 BRPM | BODY MASS INDEX: 41.52 KG/M2 | HEIGHT: 70 IN | SYSTOLIC BLOOD PRESSURE: 131 MMHG

## 2022-04-01 DIAGNOSIS — Z12.11 SPECIAL SCREENING FOR MALIGNANT NEOPLASMS, COLON: ICD-10-CM

## 2022-04-01 PROCEDURE — 0DJD8ZZ INSPECTION OF LOWER INTESTINAL TRACT, VIA NATURAL OR ARTIFICIAL OPENING ENDOSCOPIC: ICD-10-PCS | Performed by: INTERNAL MEDICINE

## 2022-04-01 RX ORDER — HYDROCODONE BITARTRATE AND ACETAMINOPHEN 10; 325 MG/1; MG/1
2 TABLET ORAL AS NEEDED
Status: DISCONTINUED | OUTPATIENT
Start: 2022-04-01 | End: 2022-04-01

## 2022-04-01 RX ORDER — HYDROMORPHONE HYDROCHLORIDE 1 MG/ML
0.4 INJECTION, SOLUTION INTRAMUSCULAR; INTRAVENOUS; SUBCUTANEOUS EVERY 5 MIN PRN
Status: DISCONTINUED | OUTPATIENT
Start: 2022-04-01 | End: 2022-04-01

## 2022-04-01 RX ORDER — METOCLOPRAMIDE HYDROCHLORIDE 5 MG/ML
10 INJECTION INTRAMUSCULAR; INTRAVENOUS AS NEEDED
Status: DISCONTINUED | OUTPATIENT
Start: 2022-04-01 | End: 2022-04-01

## 2022-04-01 RX ORDER — SODIUM CHLORIDE, SODIUM LACTATE, POTASSIUM CHLORIDE, CALCIUM CHLORIDE 600; 310; 30; 20 MG/100ML; MG/100ML; MG/100ML; MG/100ML
INJECTION, SOLUTION INTRAVENOUS CONTINUOUS
Status: DISCONTINUED | OUTPATIENT
Start: 2022-04-01 | End: 2022-04-01

## 2022-04-01 RX ORDER — LIDOCAINE HYDROCHLORIDE 10 MG/ML
INJECTION, SOLUTION EPIDURAL; INFILTRATION; INTRACAUDAL; PERINEURAL AS NEEDED
Status: DISCONTINUED | OUTPATIENT
Start: 2022-04-01 | End: 2022-04-01 | Stop reason: SURG

## 2022-04-01 RX ORDER — ONDANSETRON 2 MG/ML
4 INJECTION INTRAMUSCULAR; INTRAVENOUS AS NEEDED
Status: DISCONTINUED | OUTPATIENT
Start: 2022-04-01 | End: 2022-04-01

## 2022-04-01 RX ORDER — HYDROCODONE BITARTRATE AND ACETAMINOPHEN 10; 325 MG/1; MG/1
1 TABLET ORAL AS NEEDED
Status: DISCONTINUED | OUTPATIENT
Start: 2022-04-01 | End: 2022-04-01

## 2022-04-01 RX ORDER — NALOXONE HYDROCHLORIDE 0.4 MG/ML
80 INJECTION, SOLUTION INTRAMUSCULAR; INTRAVENOUS; SUBCUTANEOUS AS NEEDED
Status: DISCONTINUED | OUTPATIENT
Start: 2022-04-01 | End: 2022-04-01

## 2022-04-01 RX ADMIN — LIDOCAINE HYDROCHLORIDE 25 MG: 10 INJECTION, SOLUTION EPIDURAL; INFILTRATION; INTRACAUDAL; PERINEURAL at 09:06:00

## 2022-04-01 RX ADMIN — SODIUM CHLORIDE, SODIUM LACTATE, POTASSIUM CHLORIDE, CALCIUM CHLORIDE: 600; 310; 30; 20 INJECTION, SOLUTION INTRAVENOUS at 09:19:00

## 2022-04-01 RX ADMIN — SODIUM CHLORIDE, SODIUM LACTATE, POTASSIUM CHLORIDE, CALCIUM CHLORIDE: 600; 310; 30; 20 INJECTION, SOLUTION INTRAVENOUS at 09:05:00

## 2022-10-22 LAB
ALKALINE PHOSPHATASE: 73
ALT: 29
AST: 23
BILIRUBIN, TOTAL: 0.6 MG/DL
BUN: 14
CHLORIDE: 103
CHOL/HDL RATIO: 4.9
CREATININE: 0.94 MG/DL
EGFR: 98
GLUCOSE: 120
HCT: 45
HDL CHOLESTEROL: 38 MG/DL
HEMOGLOBIN A1C: 6.2 % (ref 4.3–5.6)
HGB: 15
LDL CHOLESTEROL: 113 MG/DL (ref ?–100)
MEAN CELL VOLUME: 88
MEAN CORPUSCULAR HEMOGLOBIN: 29.2
MEAN CORPUSCULAR HGB CONC: 33.3
PLT: 240
POTASSIUM: 5
PSA: 0.5
RED BLOOD COUNT: 5.13
RED CELL DISTRIBUTION WIDTH: 12.9
SODIUM: 139
TOTAL CHOLESTEROL: 185 MG/DL (ref ?–200)
TRIGLYCERIDES: 192 MG/DL
URIC ACID: 8.7
WBC: 7.6

## 2022-10-31 ENCOUNTER — TELEPHONE (OUTPATIENT)
Dept: INTERNAL MEDICINE CLINIC | Facility: CLINIC | Age: 52
End: 2022-10-31

## 2022-10-31 NOTE — TELEPHONE ENCOUNTER
Incoming Lab Results from [address] [place] [address] in Dr. Marla Teresa in basket for him to Review.

## 2023-02-06 ENCOUNTER — PATIENT MESSAGE (OUTPATIENT)
Dept: INTERNAL MEDICINE CLINIC | Facility: CLINIC | Age: 53
End: 2023-02-06

## 2023-03-14 RX ORDER — INDOMETHACIN 50 MG/1
50 CAPSULE ORAL
Qty: 15 CAPSULE | Refills: 0 | Status: SHIPPED | OUTPATIENT
Start: 2023-03-14

## 2023-06-02 ENCOUNTER — PATIENT MESSAGE (OUTPATIENT)
Dept: INTERNAL MEDICINE CLINIC | Facility: CLINIC | Age: 53
End: 2023-06-02

## 2023-06-02 NOTE — TELEPHONE ENCOUNTER
Last virtual visit 12/16/2020 and in office 09/28/2020    Per Dr Shashi Garza ok to double book on 06/03 @ [phone]

## 2023-06-12 NOTE — TELEPHONE ENCOUNTER
Appt scheduled    Future Appointments   Date Time Provider Bar Carcamo   6/14/2023 11:30 AM Marylen Shan, PA [facility] [place]

## 2023-07-03 ENCOUNTER — PATIENT MESSAGE (OUTPATIENT)
Dept: INTERNAL MEDICINE CLINIC | Facility: CLINIC | Age: 53
End: 2023-07-03

## 2023-07-06 ENCOUNTER — OFFICE VISIT (OUTPATIENT)
Dept: INTERNAL MEDICINE CLINIC | Facility: CLINIC | Age: 53
End: 2023-07-06
Payer: COMMERCIAL

## 2023-07-06 VITALS
HEART RATE: 80 BPM | DIASTOLIC BLOOD PRESSURE: 90 MMHG | RESPIRATION RATE: 20 BRPM | WEIGHT: 293.81 LBS | HEIGHT: 70 IN | SYSTOLIC BLOOD PRESSURE: 130 MMHG | TEMPERATURE: 98 F | BODY MASS INDEX: 42.06 KG/M2

## 2023-07-06 DIAGNOSIS — Z12.5 SCREENING FOR MALIGNANT NEOPLASM OF PROSTATE: ICD-10-CM

## 2023-07-06 DIAGNOSIS — R74.8 ELEVATED LIVER ENZYMES: ICD-10-CM

## 2023-07-06 DIAGNOSIS — G89.29 CHRONIC BILATERAL LOW BACK PAIN WITH LEFT-SIDED SCIATICA: Primary | ICD-10-CM

## 2023-07-06 DIAGNOSIS — R73.03 PREDIABETES: Chronic | ICD-10-CM

## 2023-07-06 DIAGNOSIS — E78.2 MIXED HYPERLIPIDEMIA: Chronic | ICD-10-CM

## 2023-07-06 DIAGNOSIS — M54.42 CHRONIC BILATERAL LOW BACK PAIN WITH LEFT-SIDED SCIATICA: Primary | ICD-10-CM

## 2023-07-06 PROCEDURE — 3008F BODY MASS INDEX DOCD: CPT | Performed by: INTERNAL MEDICINE

## 2023-07-06 PROCEDURE — 99214 OFFICE O/P EST MOD 30 MIN: CPT | Performed by: INTERNAL MEDICINE

## 2023-07-06 PROCEDURE — 3080F DIAST BP >= 90 MM HG: CPT | Performed by: INTERNAL MEDICINE

## 2023-07-06 PROCEDURE — 3075F SYST BP GE 130 - 139MM HG: CPT | Performed by: INTERNAL MEDICINE

## 2023-07-06 RX ORDER — GABAPENTIN 300 MG/1
300 CAPSULE ORAL NIGHTLY
Qty: 30 CAPSULE | Refills: 2 | Status: SHIPPED | OUTPATIENT
Start: 2023-07-06

## 2023-07-06 RX ORDER — CYCLOBENZAPRINE HCL 10 MG
10 TABLET ORAL NIGHTLY
COMMUNITY
Start: 2023-07-04

## 2023-07-06 NOTE — PATIENT INSTRUCTIONS
- Stop hydrocodone pain medication  - Start gabapentin, nerve pain medication. Take 1 capsule nightly  - Flexeril refilled  - Consider follow up with our spine surgeon if your back or leg pain does not get better:  Dr. Osman Stubbs. [address] [facility], [address] [place] [address]  [phone]    - Follow up with me in 1 month for your physical.  Get fasting blood tests done at least 2 days before your physical.    It was a pleasure seeing you in the clinic today. Thank you for choosing the [facility] office for your healthcare needs. Please call at [phone] with any questions or concerns.     Dorothy Pierce MD

## 2023-07-21 ENCOUNTER — PATIENT MESSAGE (OUTPATIENT)
Dept: INTERNAL MEDICINE CLINIC | Facility: CLINIC | Age: 53
End: 2023-07-21

## 2023-07-21 DIAGNOSIS — M54.42 CHRONIC BILATERAL LOW BACK PAIN WITH LEFT-SIDED SCIATICA: ICD-10-CM

## 2023-07-21 DIAGNOSIS — G89.29 CHRONIC BILATERAL LOW BACK PAIN WITH LEFT-SIDED SCIATICA: ICD-10-CM

## 2023-07-21 RX ORDER — GABAPENTIN 300 MG/1
300 CAPSULE ORAL 3 TIMES DAILY
Qty: 90 CAPSULE | Refills: 1 | Status: SHIPPED | OUTPATIENT
Start: 2023-07-21

## 2023-07-21 NOTE — TELEPHONE ENCOUNTER
Called pharmacy and provided good rx code to fill 24 capsule self pay. About $8 - pharmacy states will be ready for  today. [facility] sent to patient.

## 2023-08-23 ENCOUNTER — PATIENT MESSAGE (OUTPATIENT)
Dept: INTERNAL MEDICINE CLINIC | Facility: CLINIC | Age: 53
End: 2023-08-23

## 2023-09-08 RX ORDER — BENZONATATE 100 MG/1
100 CAPSULE ORAL 3 TIMES DAILY PRN
Qty: 30 CAPSULE | Refills: 0 | Status: SHIPPED | OUTPATIENT
Start: 2023-09-08

## 2023-09-08 NOTE — TELEPHONE ENCOUNTER
LOV: 7/6/2023 with Dr. Back  RTC: 1-2 months  Last Relevant Labs:   Filled: 8/26/2023   #30 with 0 refills    Future Appointments   Date Time Provider Bar Carcamo   9/9/2023 10:00 AM [facility] [place]

## 2023-09-09 ENCOUNTER — LAB ENCOUNTER (OUTPATIENT)
Dept: LAB | Age: 53
End: 2023-09-09
Attending: INTERNAL MEDICINE
Payer: COMMERCIAL

## 2023-09-09 DIAGNOSIS — R74.8 ELEVATED LIVER ENZYMES: ICD-10-CM

## 2023-09-09 DIAGNOSIS — E78.2 MIXED HYPERLIPIDEMIA: Chronic | ICD-10-CM

## 2023-09-09 DIAGNOSIS — R73.03 PREDIABETES: Chronic | ICD-10-CM

## 2023-09-09 DIAGNOSIS — Z12.5 SCREENING FOR MALIGNANT NEOPLASM OF PROSTATE: ICD-10-CM

## 2023-09-09 LAB
ALBUMIN SERPL-MCNC: 3.5 G/DL (ref 3.4–5)
ALBUMIN/GLOB SERPL: 0.9 {RATIO} (ref 1–2)
ALP LIVER SERPL-CCNC: 62 U/L
ALT SERPL-CCNC: 39 U/L
ANION GAP SERPL CALC-SCNC: 7 MMOL/L (ref 0–18)
AST SERPL-CCNC: 17 U/L (ref 15–37)
BILIRUB SERPL-MCNC: 0.6 MG/DL (ref 0.1–2)
BUN BLD-MCNC: 13 MG/DL (ref 7–18)
CALCIUM BLD-MCNC: 8.6 MG/DL (ref 8.5–10.1)
CHLORIDE SERPL-SCNC: 109 MMOL/L (ref 98–112)
CHOLEST SERPL-MCNC: 185 MG/DL (ref ?–200)
CO2 SERPL-SCNC: 22 MMOL/L (ref 21–32)
COMPLEXED PSA SERPL-MCNC: 0.51 NG/ML (ref ?–4)
CREAT BLD-MCNC: 1 MG/DL
EGFRCR SERPLBLD CKD-EPI 2021: 90 ML/MIN/1.73M2 (ref 60–?)
EST. AVERAGE GLUCOSE BLD GHB EST-MCNC: 131 MG/DL (ref 68–126)
FASTING PATIENT LIPID ANSWER: YES
FASTING STATUS PATIENT QL REPORTED: YES
GLOBULIN PLAS-MCNC: 3.9 G/DL (ref 2.8–4.4)
GLUCOSE BLD-MCNC: 120 MG/DL (ref 70–99)
HBA1C MFR BLD: 6.2 % (ref ?–5.7)
HDLC SERPL-MCNC: 39 MG/DL (ref 40–59)
LDLC SERPL CALC-MCNC: 117 MG/DL (ref ?–100)
NONHDLC SERPL-MCNC: 146 MG/DL (ref ?–130)
OSMOLALITY SERPL CALC.SUM OF ELEC: 287 MOSM/KG (ref 275–295)
POTASSIUM SERPL-SCNC: 3.8 MMOL/L (ref 3.5–5.1)
PROT SERPL-MCNC: 7.4 G/DL (ref 6.4–8.2)
SODIUM SERPL-SCNC: 138 MMOL/L (ref 136–145)
TRIGL SERPL-MCNC: 163 MG/DL (ref 30–149)
VLDLC SERPL CALC-MCNC: 29 MG/DL (ref 0–30)

## 2023-09-09 PROCEDURE — 80053 COMPREHEN METABOLIC PANEL: CPT

## 2023-09-09 PROCEDURE — 36415 COLL VENOUS BLD VENIPUNCTURE: CPT

## 2023-09-09 PROCEDURE — 80061 LIPID PANEL: CPT

## 2023-09-09 PROCEDURE — 83036 HEMOGLOBIN GLYCOSYLATED A1C: CPT

## 2023-12-12 RX ORDER — INDOMETHACIN 50 MG/1
50 CAPSULE ORAL
Qty: 15 CAPSULE | Refills: 1 | Status: SHIPPED | OUTPATIENT
Start: 2023-12-12

## 2023-12-12 NOTE — TELEPHONE ENCOUNTER
See [facility] from patient. Last refill:  Medication Quantity Refills Start End   indomethacin 50 MG Oral Cap (Discontinued) 15 capsule 0 3/14/2023 7/6/2023   Sig:   Take 1 capsule (50 mg total) by mouth 3 (three) times daily with meals.

## 2024-03-07 ENCOUNTER — OFFICE VISIT (OUTPATIENT)
Dept: INTERNAL MEDICINE CLINIC | Facility: CLINIC | Age: 54
End: 2024-03-07
Payer: COMMERCIAL

## 2024-03-07 VITALS
DIASTOLIC BLOOD PRESSURE: 80 MMHG | HEIGHT: 70 IN | OXYGEN SATURATION: 96 % | WEIGHT: 305 LBS | BODY MASS INDEX: 43.67 KG/M2 | TEMPERATURE: 99 F | RESPIRATION RATE: 18 BRPM | SYSTOLIC BLOOD PRESSURE: 114 MMHG | HEART RATE: 80 BPM

## 2024-03-07 DIAGNOSIS — E78.2 MIXED HYPERLIPIDEMIA: Chronic | ICD-10-CM

## 2024-03-07 DIAGNOSIS — M1A.0710 CHRONIC GOUT OF RIGHT FOOT, UNSPECIFIED CAUSE: ICD-10-CM

## 2024-03-07 DIAGNOSIS — E66.01 MORBID OBESITY (HCC): ICD-10-CM

## 2024-03-07 DIAGNOSIS — R73.03 PREDIABETES: Chronic | ICD-10-CM

## 2024-03-07 DIAGNOSIS — Z00.00 ENCOUNTER FOR PREVENTATIVE ADULT HEALTH CARE EXAMINATION: Primary | ICD-10-CM

## 2024-03-07 DIAGNOSIS — R74.8 ELEVATED LIVER ENZYMES: ICD-10-CM

## 2024-03-07 PROCEDURE — 3074F SYST BP LT 130 MM HG: CPT | Performed by: INTERNAL MEDICINE

## 2024-03-07 PROCEDURE — 3079F DIAST BP 80-89 MM HG: CPT | Performed by: INTERNAL MEDICINE

## 2024-03-07 PROCEDURE — 99396 PREV VISIT EST AGE 40-64: CPT | Performed by: INTERNAL MEDICINE

## 2024-03-07 PROCEDURE — 3008F BODY MASS INDEX DOCD: CPT | Performed by: INTERNAL MEDICINE

## 2024-03-07 NOTE — PROGRESS NOTES
Rubio Wolf is a 53 year old male.   HPI:     Chief Complaint   Patient presents with    Physical     Patient presents for CPX/wellness examination.  Diet: Could be better  Exercise: Occasional  Vision: Up to date  Dental: Up to date    Acute issues:    Chronic issues:  Hyperlipidemia, prediabetes - lifestyle controlled.  Elevated liver enzymes - no jaundice or icterus.  Gout - occasional flares in right foot.    Past medical, family, surgical and social history were reviewed as listed in the chart, and are unchanged from previous visit.  REVIEW OF SYSTEMS:   GENERAL/ const: no fevers/chills, no unintentional weight loss  SKIN: denies any unusual skin lesions  EYES:no vision problems  HEENT: denies sinus pain or sinus tenderness  LUNGS: denies shortness of breath   CARDIOVASCULAR: denies chest pain  GI: denies nausea/emesis/ abdominal pain diarrhea constipation  : denies dysuria   MUSCULOSKELETAL: chronic back pain  NEURO: denies headaches  PSYCHIATRIC: denies issues  ENDOCRINE: no hot/cold intolerance  ALLERGY: No Known Allergies  PAST HISTORY:     Current Outpatient Medications:     indomethacin 50 MG Oral Cap, Take 1 capsule (50 mg total) by mouth 3 (three) times daily with meals., Disp: 15 capsule, Rfl: 1    gabapentin 300 MG Oral Cap, Take 1 capsule (300 mg total) by mouth 3 (three) times daily. (Patient taking differently: Take 200 mg by mouth in the morning and 200 mg before bedtime.), Disp: 90 capsule, Rfl: 1    cyclobenzaprine 10 MG Oral Tab, Take 1 tablet (10 mg total) by mouth nightly., Disp: , Rfl:     Ibuprofen (ADVIL LIQUI-GELS MINIS OR), Take by mouth as needed., Disp: , Rfl:     aspirin 81 MG Oral Tab, Take 1 tablet (81 mg total) by mouth daily., Disp: , Rfl:   Medical:  has a past medical history of Decorative tattoo, Diarrhea, unspecified, Flatulence/gas pain/belching, Hemorrhoids, History of 2019 novel coronavirus disease (COVID-19) (11/2020), Obstructive apnea (09/15/2017), Osteoarthritis,  Sigmoid diverticulitis (06/18/2019), Visual impairment, and Wears glasses.  Surgical:  has a past surgical history that includes adenoidectomy (2010); tonsillectomy (2010); colonoscopy (N/A, 04/01/2022); and other surgical history (2010).  Family: family history includes Cancer in his mother; Diabetes in his father, mother, and sister; Hypertension in his father.  Social:  reports that he quit smoking about 26 years ago. His smoking use included cigarettes and cigars. He has never used smokeless tobacco. He reports current alcohol use. He reports that he does not use drugs.  Wt Readings from Last 6 Encounters:   03/07/24 (!) 305 lb (138.3 kg)   07/06/23 293 lb 12.8 oz (133.3 kg)   03/28/22 290 lb (131.5 kg)   03/28/22 290 lb (131.5 kg)   11/23/21 285 lb (129.3 kg)   12/14/20 285 lb (129.3 kg)     EXAM:   /80 (BP Location: Left arm, Patient Position: Sitting, Cuff Size: large)   Pulse 80   Temp 98.5 °F (36.9 °C) (Temporal)   Resp 18   Ht 5' 10\" (1.778 m)   Wt (!) 305 lb (138.3 kg)   SpO2 96%   BMI 43.76 kg/m²   GENERAL: Alert and oriented, well developed, well nourished,in no apparent distress  SKIN: no rashes,no suspicious lesions  HEENT: atraumatic, PERRLA, EOMI, normal lid and conjunctiva, normal external canals and tympanic membranes bilaterally  NECK: supple, no jvd, no thyromegaly, no palpable/tender cervical lymphadenopathy  LUNGS: clear to auscultation bilaterally, no wheezing/rubs  CARDIO: RRR without murmurs.  No clubbing, cyanosis or edema.  GI: soft non tender nondistended no hepatosplenomegaly, bowel sounds throughout  NEURO: CN II-XII intact, 5/5 strength all extremities  MS: Full ROM  PSYCH: pleasant, appropriate mood and affect  ASSESSMENT AND PLAN:   1.  Encounter for preventative adult health examination  2. Morbid obesity (HCC)  Age appropriate health guidance and counseling provided.  Screening labs done in September 2023.  Body mass index is 43.76 kg/m².  Focus on lifestyle  modification.  Patient inquiring about GLP-1RA therapy.  No history of pancreatitis.  No family hx of thyroid cancer/MEN syndrome.  Advised him to check with insurance about coverage and his local pharmacy about availability.      3. Mixed hyperlipidemia  4. Prediabetes  Lifestyle controlled.  Labs ordered as below.  - Lipid Panel; Future  - Comp Metabolic Panel (14); Future  - Hemoglobin A1C; Future    5. Elevated liver enzymes  Will check updated metabolic panel.  - Comp Metabolic Panel (14); Future    6. Chronic gout of right foot, unspecified cause  Having some flares of right foot.  Will check uric acid levels.  May start on allopurinol if uric acid levels are high again.  - Uric Acid; Future    Patient Care Team:  Tara Mcrae MD as PCP - General (Internal Medicine)  The patient indicates understanding of these issues and agrees to the plan.  The patient is asked to return to clinic in 12 months for follow up on chronic issues/CPX, or earlier if acute issues arise.    Tara Mcrae MD

## 2024-03-07 NOTE — PATIENT INSTRUCTIONS
- Repeat blood pressure reading looks great.  Will continue to monitor.  - Get blood tests done when fasting (water and medications only for 8 hours).  - If uric acid (gout) blood test is still high, we may start you on a daily medication, allopurinol, to help decrease/prevent gout flare ups  - You can check with your insurance to see if they cover these weight loss medications, then check with your local pharmacy to see if they have them in stock:  Wegovy, Saxenda, or Zepbound  - Otherwise follow up in 1 year for physical/chronic issues; follow up earlier as needed.    It was a pleasure seeing you in the clinic today.  Thank you for choosing the [facility] office for your healthcare needs. Please call at [phone] with any questions or concerns.    Tara Mcrae MD

## 2024-03-11 ENCOUNTER — PATIENT MESSAGE (OUTPATIENT)
Dept: INTERNAL MEDICINE CLINIC | Facility: CLINIC | Age: 54
End: 2024-03-11

## 2024-03-11 NOTE — TELEPHONE ENCOUNTER
From: Rubio Wolf  To: Tara Mcrae  Sent: 3/11/2024 5:00 PM CDT  Subject: Wegovy     Good afternoon Doc,    I called my [employer] and they do cover Wegovy for weight loss. They said that they will need a   pre-cert for this.

## 2024-03-11 NOTE — TELEPHONE ENCOUNTER
LOV:   03/07/2024 Dr Mcrae   1.  Encounter for preventative adult health examination  2. Morbid obesity (HCC)  Age appropriate health guidance and counseling provided.  Screening labs done in September 2023.  Body mass index is 43.76 kg/m².  Focus on lifestyle modification.  Patient inquiring about GLP-1RA therapy.  No history of pancreatitis.  No family hx of thyroid cancer/MEN syndrome.  Advised him to check with insurance about coverage and his local pharmacy about availability.      - You can check with your insurance to see if they cover these weight loss medications, then check with your local pharmacy to see if they have them in stock:  Wegovy, Saxenda, or Zepbound

## 2024-03-12 RX ORDER — TIRZEPATIDE 2.5 MG/.5ML
2.5 INJECTION, SOLUTION SUBCUTANEOUS WEEKLY
Qty: 2 ML | Refills: 0 | Status: SHIPPED | OUTPATIENT
Start: 2024-03-12

## 2024-03-30 ENCOUNTER — LAB ENCOUNTER (OUTPATIENT)
Dept: LAB | Age: 54
End: 2024-03-30
Attending: INTERNAL MEDICINE
Payer: COMMERCIAL

## 2024-03-30 DIAGNOSIS — R73.03 PREDIABETES: Chronic | ICD-10-CM

## 2024-03-30 DIAGNOSIS — R74.8 ELEVATED LIVER ENZYMES: ICD-10-CM

## 2024-03-30 DIAGNOSIS — E78.2 MIXED HYPERLIPIDEMIA: Chronic | ICD-10-CM

## 2024-03-30 DIAGNOSIS — M1A.0710 CHRONIC GOUT OF RIGHT FOOT, UNSPECIFIED CAUSE: ICD-10-CM

## 2024-03-30 LAB
ALBUMIN SERPL-MCNC: 3.7 G/DL (ref 3.4–5)
ALBUMIN/GLOB SERPL: 1 {RATIO} (ref 1–2)
ALP LIVER SERPL-CCNC: 74 U/L
ALT SERPL-CCNC: 40 U/L
ANION GAP SERPL CALC-SCNC: 5 MMOL/L (ref 0–18)
AST SERPL-CCNC: 25 U/L (ref 15–37)
BILIRUB SERPL-MCNC: 0.5 MG/DL (ref 0.1–2)
BUN BLD-MCNC: 13 MG/DL (ref 9–23)
CALCIUM BLD-MCNC: 8.8 MG/DL (ref 8.5–10.1)
CHLORIDE SERPL-SCNC: 110 MMOL/L (ref 98–112)
CHOLEST SERPL-MCNC: 206 MG/DL (ref ?–200)
CO2 SERPL-SCNC: 24 MMOL/L (ref 21–32)
CREAT BLD-MCNC: 1.16 MG/DL
EGFRCR SERPLBLD CKD-EPI 2021: 75 ML/MIN/1.73M2 (ref 60–?)
EST. AVERAGE GLUCOSE BLD GHB EST-MCNC: 146 MG/DL (ref 68–126)
FASTING PATIENT LIPID ANSWER: YES
FASTING STATUS PATIENT QL REPORTED: YES
GLOBULIN PLAS-MCNC: 3.8 G/DL (ref 2.8–4.4)
GLUCOSE BLD-MCNC: 115 MG/DL (ref 70–99)
HBA1C MFR BLD: 6.7 % (ref ?–5.7)
HDLC SERPL-MCNC: 40 MG/DL (ref 40–59)
LDLC SERPL CALC-MCNC: 127 MG/DL (ref ?–100)
NONHDLC SERPL-MCNC: 166 MG/DL (ref ?–130)
OSMOLALITY SERPL CALC.SUM OF ELEC: 289 MOSM/KG (ref 275–295)
POTASSIUM SERPL-SCNC: 3.9 MMOL/L (ref 3.5–5.1)
PROT SERPL-MCNC: 7.5 G/DL (ref 6.4–8.2)
SODIUM SERPL-SCNC: 139 MMOL/L (ref 136–145)
TRIGL SERPL-MCNC: 220 MG/DL (ref 30–149)
URATE SERPL-MCNC: 7.7 MG/DL
VLDLC SERPL CALC-MCNC: 40 MG/DL (ref 0–30)

## 2024-03-30 PROCEDURE — 80053 COMPREHEN METABOLIC PANEL: CPT

## 2024-03-30 PROCEDURE — 36415 COLL VENOUS BLD VENIPUNCTURE: CPT

## 2024-03-30 PROCEDURE — 84550 ASSAY OF BLOOD/URIC ACID: CPT

## 2024-03-30 PROCEDURE — 80061 LIPID PANEL: CPT

## 2024-03-30 PROCEDURE — 83036 HEMOGLOBIN GLYCOSYLATED A1C: CPT

## 2024-04-02 ENCOUNTER — TELEPHONE (OUTPATIENT)
Dept: INTERNAL MEDICINE CLINIC | Facility: CLINIC | Age: 54
End: 2024-04-02

## 2024-04-02 DIAGNOSIS — E79.0 ELEVATED URIC ACID IN BLOOD: Primary | ICD-10-CM

## 2024-04-02 NOTE — TELEPHONE ENCOUNTER
Pt okay with starting allopurinol.   Pharmacy is [facility].  Scheduled pt an appt.   Future Appointments   Date Time Provider Department Center   4/25/2024  4:00 PM Tara Mcrae MD [facility] [place]

## 2024-04-02 NOTE — TELEPHONE ENCOUNTER
----- Message from Leonela Garcia MD sent at 4/1/2024  8:08 AM CDT -----  Helping cover.   Please inform patient that A1c is now in diabetic range and cholesterol above goal; should make appt to further discuss management/tx with Dr. Mcrae.  Uric acid level high- consider starting allopurinol and having follow-up for gout

## 2024-04-03 RX ORDER — ALLOPURINOL 100 MG/1
100 TABLET ORAL DAILY
Qty: 90 TABLET | Refills: 0 | Status: SHIPPED | OUTPATIENT
Start: 2024-04-03

## 2024-04-03 RX ORDER — COLCHICINE 0.6 MG/1
0.6 TABLET ORAL DAILY
Qty: 90 TABLET | Refills: 0 | Status: SHIPPED | OUTPATIENT
Start: 2024-04-03

## 2024-04-03 NOTE — TELEPHONE ENCOUNTER
Sent in allopurinol and colchicine. colchicine is taken for usually 3 months while reducing uric acid with allopurinol to avoid a gout flare.   Should check uric acid in 2 weeks and follow-up with PCP.

## 2024-04-11 ENCOUNTER — PATIENT MESSAGE (OUTPATIENT)
Dept: INTERNAL MEDICINE CLINIC | Facility: CLINIC | Age: 54
End: 2024-04-11

## 2024-04-11 NOTE — TELEPHONE ENCOUNTER
From: Rubio Wolf  To: Tara Mcrae  Sent: 4/11/2024 8:10 AM CDT  Subject: Left side pain    Good morning Doc or Ani,    I’ve had pain in my left side, lower back ribs above the love handle area. I’ve had it for a few weeks now, seems to be getting worse. Stomach pain as well. Been taking Naproxen 3 times a day but doesn’t do anything. Don’t know if it’s a pull or something more…..  I have an appointment with you on the 25th but I think I need to see you sooner if possible.

## 2024-04-11 NOTE — TELEPHONE ENCOUNTER
RP - please advise - you have a 15 minute opening tomorrow, or should pt be seen more urgently? Ty!    Pt called office, did not read MCM.     Pt states the pain is on the right side, near the back part of the ribs. Has been worsening for a couple of weeks. Sharp pain when he stretches, moves, but now even when sitting or lying down.     Pt has chronic low back pain, but this is different. Pt does not recall a specific injury event, but he is a , so lifting is an every day event.     Pt said the stomach pain is new, comes and goes, right in the middle of his stomach, above the umbilicus.     Pt denies constipation, diarrhea, nausea, vomiting, or fevers.   Pt also denies any changes to urination, no pain, frequency, or blood in urine.    Wife is in health care and wants pt to get checked out.

## 2024-04-11 NOTE — TELEPHONE ENCOUNTER
Ok to use my 15 minute slot tomorrow - if patient can't make that appointment may need immediate care evaluation

## 2024-04-11 NOTE — TELEPHONE ENCOUNTER
S/w patient and scheduled OV for 4/12.   Patient aware to proceed to ED/ICC if new/worsening symptoms.

## 2024-04-12 ENCOUNTER — HOSPITAL ENCOUNTER (EMERGENCY)
Age: 54
Discharge: HOME | End: 2024-04-12
Attending: EMERGENCY MEDICINE
Payer: COMMERCIAL

## 2024-04-12 VITALS
RESPIRATION RATE: 16 BRPM | SYSTOLIC BLOOD PRESSURE: 147 MMHG | HEART RATE: 77 BPM | BODY MASS INDEX: 42.23 KG/M2 | HEIGHT: 70 IN | OXYGEN SATURATION: 95 % | WEIGHT: 295 LBS | TEMPERATURE: 98 F | DIASTOLIC BLOOD PRESSURE: 86 MMHG

## 2024-04-12 DIAGNOSIS — R10.9 FLANK PAIN: Primary | ICD-10-CM

## 2024-04-12 LAB
BILIRUB UR QL STRIP.AUTO: NEGATIVE
CLARITY UR REFRACT.AUTO: CLEAR
COLOR UR AUTO: YELLOW
GLUCOSE UR STRIP.AUTO-MCNC: NEGATIVE MG/DL
LEUKOCYTE ESTERASE UR QL STRIP.AUTO: NEGATIVE
NITRITE UR QL STRIP.AUTO: NEGATIVE
PH UR STRIP.AUTO: 5 [PH] (ref 5–8)
PROT UR STRIP.AUTO-MCNC: NEGATIVE MG/DL
RBC UR QL AUTO: NEGATIVE
SP GR UR STRIP.AUTO: 1.02 (ref 1–1.03)
UROBILINOGEN UR STRIP.AUTO-MCNC: 0.2 MG/DL

## 2024-04-12 PROCEDURE — 81003 URINALYSIS AUTO W/O SCOPE: CPT | Performed by: EMERGENCY MEDICINE

## 2024-04-12 PROCEDURE — 99283 EMERGENCY DEPT VISIT LOW MDM: CPT

## 2024-04-12 PROCEDURE — 99284 EMERGENCY DEPT VISIT MOD MDM: CPT

## 2024-04-12 RX ORDER — CYCLOBENZAPRINE HCL 10 MG
10 TABLET ORAL 3 TIMES DAILY PRN
Qty: 20 TABLET | Refills: 0 | Status: SHIPPED | OUTPATIENT
Start: 2024-04-12 | End: 2024-04-19

## 2024-04-12 NOTE — ED PROVIDER NOTES
Patient Seen in: [facility] [place]      History     Chief Complaint   Patient presents with    Back Pain     Stated Complaint: Left rib/back pain X3 weeks    Subjective:   HPI    Left flank pain for about 3 weeks.  No trauma.  Is worse when he twists or turns a certain way.  No numbness or tingling or weakness in the legs bowel bladder changes.  No abdominal pain nausea vomiting diarrhea.  Naproxen does not seem to help.  No other complaints.    Objective:   Past Medical History:    Decorative tattoo    Diarrhea, unspecified    Flatulence/gas pain/belching    Hemorrhoids    History of 2019 novel coronavirus disease (COVID-19)    fever, cough; not hospitalized; no ligering symptoms    Obstructive apnea    Edwrad PSG AHI 60 SaO2 robbi 83%    Osteoarthritis    Sigmoid diverticulitis    Visual impairment    Wears glasses              Past Surgical History:   Procedure Laterality Date    Adenoidectomy      Colonoscopy N/A 2022    Procedure: COLONOSCOPY;  Surgeon: Baron Hyde DO;  Location:  ENDOSCOPY    Other surgical history      Apnea Surgery    Tonsillectomy                  Social History     Socioeconomic History    Marital status:    Tobacco Use    Smoking status: Former     Current packs/day: 0.00     Types: Cigarettes, Cigars     Quit date: 1998     Years since quittin.2    Smokeless tobacco: Never   Vaping Use    Vaping status: Never Used   Substance and Sexual Activity    Alcohol use: Yes     Comment: 2 drinks of liquor per month.     Drug use: No   Other Topics Concern    Caffeine Concern No    Exercise No    Seat Belt Yes    Special Diet No    Stress Concern No    Weight Concern Yes              Review of Systems    Positive for stated complaint: Left rib/back pain X3 weeks  Other systems are as noted in HPI.  Constitutional and vital signs reviewed.      All other systems reviewed and negative except as noted above.    Physical Exam     ED  Triage Vitals [04/12/24 1656]   /86   Pulse 77   Resp 16   Temp 97.6 °F (36.4 °C)   Temp src Temporal   SpO2 95 %   O2 Device None (Room air)       Current:/86   Pulse 77   Temp 97.6 °F (36.4 °C) (Temporal)   Resp 16   Ht 177.8 cm (5' 10\")   Wt 133.8 kg   SpO2 95%   BMI 42.33 kg/m²         Physical Exam  Vitals and nursing note reviewed.   Constitutional:       General: He is not in acute distress.     Appearance: He is well-developed. He is not toxic-appearing.   HENT:      Head: Normocephalic and atraumatic.   Eyes:      General: No scleral icterus.     Conjunctiva/sclera: Conjunctivae normal.   Cardiovascular:      Rate and Rhythm: Normal rate.   Pulmonary:      Effort: Pulmonary effort is normal. No respiratory distress.   Abdominal:      General: There is no distension.   Musculoskeletal:         General: No swelling, tenderness or deformity. Normal range of motion.      Cervical back: Normal range of motion and neck supple.   Skin:     General: Skin is warm and dry.      Findings: No rash.   Neurological:      General: No focal deficit present.      Mental Status: He is alert and oriented to person, place, and time.      Cranial Nerves: No cranial nerve deficit.      Sensory: No sensory deficit.      Motor: No weakness or abnormal muscle tone.      Coordination: Coordination normal.   Psychiatric:         Behavior: Behavior normal.              ED Course     Labs Reviewed   URINALYSIS, ROUTINE - Abnormal; Notable for the following components:       Result Value    Ketones Urine Trace (*)     All other components within normal limits                       MDM          -Pulse oximetry was interpreted by me and was normal.  Pulse oximeter was ordered to monitor patient for hypoxia.           -Comorbidities did add complexity to the management are mentioned in the HPI above        -I personally reviewed the prior external notes and the medical record to obtain additional history I reviewed  patient's office visit from March 2024 patient has history of prediabetes and mixed hyperlipidemia and elevated LFTs.  History of chronic cough as well        -DDX: Includes but not limited to musculoskeletal flank pain, kidney stone      Labs Reviewed   URINALYSIS, ROUTINE - Abnormal; Notable for the following components:       Result Value    Ketones Urine Trace (*)     All other components within normal limits     Patient labs reviewed there is no hematuria or microscopic immaturity no evidence of infection.  Pain is positional.  Suspect musculoskeletal cause.  I will place him on Flexeril and have him follow-up with his primary care doctor.  Patient discharged stable condition.                                           Medical Decision Making      Disposition and Plan     Clinical Impression:  1. Flank pain         Disposition:  Discharge  4/12/2024  6:40 pm    Follow-up:  Tara Mcrae MD  [address] [facility] [address]  [phone]    Follow up            Medications Prescribed:  Discharge Medication List as of 4/12/2024  6:50 PM        START taking these medications    Details   !! cyclobenzaprine 10 MG Oral Tab Take 1 tablet (10 mg total) by mouth 3 (three) times daily as needed for Muscle spasms., Normal, Disp-20 tablet, R-0       !! - Potential duplicate medications found. Please discuss with provider.

## 2024-04-12 NOTE — ED INITIAL ASSESSMENT (HPI)
Left lower back/rib pain for 3 weeks, not getting any better, taking Naproxen without relief, no injury

## 2024-04-22 ENCOUNTER — LAB ENCOUNTER (OUTPATIENT)
Dept: LAB | Age: 54
End: 2024-04-22
Attending: INTERNAL MEDICINE
Payer: COMMERCIAL

## 2024-04-22 DIAGNOSIS — E79.0 ELEVATED URIC ACID IN BLOOD: ICD-10-CM

## 2024-04-22 LAB — URATE SERPL-MCNC: 7.3 MG/DL

## 2024-04-22 PROCEDURE — 84550 ASSAY OF BLOOD/URIC ACID: CPT

## 2024-04-22 PROCEDURE — 36415 COLL VENOUS BLD VENIPUNCTURE: CPT

## 2024-04-25 ENCOUNTER — OFFICE VISIT (OUTPATIENT)
Dept: INTERNAL MEDICINE CLINIC | Facility: CLINIC | Age: 54
End: 2024-04-25
Payer: COMMERCIAL

## 2024-04-25 VITALS
OXYGEN SATURATION: 99 % | HEART RATE: 66 BPM | SYSTOLIC BLOOD PRESSURE: 134 MMHG | TEMPERATURE: 98 F | WEIGHT: 302 LBS | BODY MASS INDEX: 43.72 KG/M2 | HEIGHT: 69.5 IN | DIASTOLIC BLOOD PRESSURE: 80 MMHG

## 2024-04-25 DIAGNOSIS — M1A.0710 CHRONIC GOUT OF RIGHT FOOT, UNSPECIFIED CAUSE: ICD-10-CM

## 2024-04-25 DIAGNOSIS — E78.2 MIXED HYPERLIPIDEMIA: Primary | Chronic | ICD-10-CM

## 2024-04-25 DIAGNOSIS — R73.03 PREDIABETES: Chronic | ICD-10-CM

## 2024-04-25 PROCEDURE — 3008F BODY MASS INDEX DOCD: CPT | Performed by: INTERNAL MEDICINE

## 2024-04-25 PROCEDURE — 99214 OFFICE O/P EST MOD 30 MIN: CPT | Performed by: INTERNAL MEDICINE

## 2024-04-25 PROCEDURE — 3075F SYST BP GE 130 - 139MM HG: CPT | Performed by: INTERNAL MEDICINE

## 2024-04-25 PROCEDURE — 3079F DIAST BP 80-89 MM HG: CPT | Performed by: INTERNAL MEDICINE

## 2024-04-25 NOTE — PATIENT INSTRUCTIONS
- A1c test was higher, in diabetes range last month (this is the 3 month blood sugar average test).  Fasting blood sugar was still in prediabetes range.  Keep an eye on sugar and carbohydrate intake  - Cholesterol levels were a little high as well  - Hold allopurinol for next few weeks; re-start Memorial Day weekend.  - Follow up with me in August; get fasting blood tests done at least 2 days before this appointment.    It was a pleasure seeing you in the clinic today.  Thank you for choosing the [facility] office for your healthcare needs. Please call at [phone] with any questions or concerns.    Tara Mcrae MD

## 2024-04-25 NOTE — PROGRESS NOTES
Rubio Wolf is a 53 year old male.   HPI:   Patient presents for follow up on several issues.    Hyperlipidemia - lipids elevated. 10 year ASCVD risk 6.4%.   Prediabetes - though fasting glucose remains in prediabetes range, A1c is now in diabetes range at 6.7%.  Diet controlled.  Chronic gout - no recent flares.  Uric acid levels slightly better at 7.2.  He has been having some dizziness/lightheadedness past week, so he started holding allopurinol - feeling better.  Had an episode of left thoracic back earlier this month, seen at immediate care - diagnosed with muscular pain.  Prescribed muscle relaxant, feeling much better.    Past medical, family, surgical and social history were reviewed as listed in the chart, and are unchanged from previous visit on 3/7/2024  REVIEW OF SYSTEMS:   GENERAL/ const: no fevers/chills, no unintentional weight loss  EYES:no vision problems  HEENT: denies sinus pain or sinus tenderness  LUNGS: denies shortness of breath   CARDIOVASCULAR: denies chest pain  GI: denies nausea/emesis/ abdominal pain diarrhea constipation  : denies dysuria   MUSCULOSKELETAL: back pain, resolved  NEURO: denies headaches  PSYCHIATRIC: denies issues  ENDOCRINE: no hot/cold intolerance  ALLERGY: No Known Allergies  PAST HISTORY:     Current Outpatient Medications:     allopurinol 100 MG Oral Tab, Take 1 tablet (100 mg total) by mouth daily., Disp: 90 tablet, Rfl: 0    colchicine 0.6 MG Oral Tab, Take 1 tablet (0.6 mg total) by mouth daily., Disp: 90 tablet, Rfl: 0    cyclobenzaprine 10 MG Oral Tab, Take 1 tablet (10 mg total) by mouth nightly as needed for Muscle spasms., Disp: , Rfl:     Ibuprofen (ADVIL LIQUI-GELS MINIS OR), Take by mouth as needed., Disp: , Rfl:     aspirin 81 MG Oral Tab, Take 1 tablet (81 mg total) by mouth daily., Disp: , Rfl:   Medical:  has a past medical history of Decorative tattoo, Diarrhea, unspecified, Flatulence/gas pain/belching, Hemorrhoids, History of 2019 novel  coronavirus disease (COVID-19) (11/2020), Obstructive apnea (09/15/2017), Osteoarthritis, Sigmoid diverticulitis (06/18/2019), Visual impairment, and Wears glasses.  Surgical:  has a past surgical history that includes adenoidectomy (2010); tonsillectomy (2010); colonoscopy (N/A, 04/01/2022); and other surgical history (2010).  Family: family history includes Cancer in his mother; Diabetes in his father, mother, and sister; Hypertension in his father.  Social:  reports that he quit smoking about 26 years ago. His smoking use included cigarettes and cigars. He has never used smokeless tobacco. He reports current alcohol use. He reports that he does not use drugs.  Wt Readings from Last 6 Encounters:   04/25/24 (!) 302 lb (137 kg)   04/12/24 295 lb (133.8 kg)   03/07/24 (!) 305 lb (138.3 kg)   07/06/23 293 lb 12.8 oz (133.3 kg)   03/28/22 290 lb (131.5 kg)   03/28/22 290 lb (131.5 kg)     EXAM:   /80 (BP Location: Left arm, Patient Position: Sitting, Cuff Size: large)   Pulse 66   Temp 98 °F (36.7 °C) (Temporal)   Ht 5' 9.5\" (1.765 m)   Wt (!) 302 lb (137 kg)   SpO2 99%   BMI 43.96 kg/m²   GENERAL: Alert and oriented, well developed, well nourished,in no apparent distress  HEENT: atraumatic, PERRLA, EOMI, normal lid and conjunctiva  LUNGS: clear to auscultation bilaterally, no wheezing/rubs  CARDIO: RRR without murmurs.  No clubbing, cyanosis or edema.  GI: soft non tender nondistended no hepatosplenomegaly, bowel sounds throughout  NEURO: CN II-XII intact, 5/5 strength all extremities  MS: Full ROM, no joint pain  PSYCH: pleasant, appropriate mood and affect  ASSESSMENT AND PLAN:   1. Mixed hyperlipidemia  Lipids elevated, 10 year ASCVD risk 6.4%.  Continue focus on lifestyle modification.  If he remains diabetes range will need to discuss statin therapy.  - Lipid Panel; Future    2. Prediabetes  Lifestyle controlled.  A1c now in diabetes range at 6.7%, fasting glucose still in prediabetes range. He has  cut down on sugars and carbs since lab results came back.  Continue focus on lifestyle modification.  If A1c remains in diabetes range will discuss starting antihyperglycemic therapy.  - Hemoglobin A1C; Future  - Comp Metabolic Panel (14); Future    3. Chronic gout of right foot, unspecified cause  Had some back pain earlier in the month - seen at immediate care, started on muscle relaxants which helped pain significantly.  No real gout flare recently.  Did have some dizziness recently - held allopurinol, felt better.  Will have him hold allopurinol for a few more weeks, then resume.  Continue colchicine and naproxen for now.  - Uric Acid; Future    Patient Care Team:  Tara cMrae MD as PCP - General (Internal Medicine)  The patient indicates understanding of these issues and agrees to the plan.  The patient is asked to return to clinic in 3 months for follow up on chronic issues/CPX, or earlier if acute issues arise.    Tara Mcrae MD

## 2024-07-02 DIAGNOSIS — M1A.0710 CHRONIC GOUT OF RIGHT FOOT, UNSPECIFIED CAUSE: Primary | ICD-10-CM

## 2024-07-02 RX ORDER — COLCHICINE 0.6 MG/1
0.6 TABLET ORAL DAILY
Qty: 90 TABLET | Refills: 3 | Status: SHIPPED | OUTPATIENT
Start: 2024-07-02

## 2024-07-02 NOTE — TELEPHONE ENCOUNTER
Requesting    Name from pharmacy: COLCHICINE 0.6MG TABLETS         Will file in chart as: COLCHICINE 0.6 MG Oral Tab    Sig: TAKE 1 TABLET(0.6 MG) BY MOUTH DAILY    Disp: 90 tablet    Refills: 0 (Pharmacy requested: Not specified)    Start: 7/2/2024    Class: Normal    Non-formulary    Last ordered: 3 months ago (4/3/2024) by Leonela Garcia MD    Last refill: 4/3/2024    Rx #: [id number]       To be filled at: [facility][id number] [address] AT [facility], [phone], [phone]        LOV: 04/25/24 w/ TILA  RTC: 07/25/24  Last Relevant Labs: 03/30/24  No future appointments.

## 2024-08-18 DIAGNOSIS — M1A.0710 CHRONIC GOUT OF RIGHT FOOT, UNSPECIFIED CAUSE: Primary | ICD-10-CM

## 2024-08-19 RX ORDER — ALLOPURINOL 100 MG/1
100 TABLET ORAL DAILY
Qty: 90 TABLET | Refills: 1 | Status: SHIPPED | OUTPATIENT
Start: 2024-08-19

## 2024-08-19 NOTE — TELEPHONE ENCOUNTER
No protocol     Allopurinol 100mg     LOV: 4/25/24   RTC:3 months  Filled: 4/3/24 #90   Labs: 4/22/24   No future appointments.

## 2024-12-31 ENCOUNTER — TELEPHONE (OUTPATIENT)
Dept: INTERNAL MEDICINE CLINIC | Facility: CLINIC | Age: 54
End: 2024-12-31

## 2024-12-31 NOTE — TELEPHONE ENCOUNTER
RP: CHYNA, recommended IC for patient due to holiday and severe pain    Spoke with patient. Pain is \"worst he's had\" and had to call off of work. States pain started last night and worsened when he tried to put weight on left foot/ankle this morning. This RN recommended patient go to immediate care due to upcoming holiday and severity of pain. Patient verbalized understanding.

## 2025-02-18 DIAGNOSIS — M1A.0710 CHRONIC GOUT OF RIGHT FOOT, UNSPECIFIED CAUSE: ICD-10-CM

## 2025-02-18 RX ORDER — ALLOPURINOL 100 MG/1
100 TABLET ORAL DAILY
Qty: 90 TABLET | Refills: 1 | Status: SHIPPED | OUTPATIENT
Start: 2025-02-18

## 2025-02-18 NOTE — TELEPHONE ENCOUNTER
LOV: 4/25/2024 with Dr. Mcrae  RTC: 3 months  Last Relevant Labs: March/April 2024  Filled: 8/19/2024    #90 with 1 refill    No future appointments.

## 2025-03-01 LAB
AMB EXT BILIRUBIN, TOTAL: 0.6 MG/DL
AMB EXT BUN: 13 MG/DL
AMB EXT CALCIUM: 9.2
AMB EXT CARBON DIOXIDE: 21
AMB EXT CHLORIDE: 103
AMB EXT CHOLESTEROL, TOTAL: 189 MG/DL
AMB EXT CMP ALT: 41 U/L
AMB EXT CMP AST: 28 U/L
AMB EXT CREATININE: 0.93 MG/DL
AMB EXT EGFR NON-AA: 98
AMB EXT GLUCOSE: 117 MG/DL
AMB EXT HDL CHOLESTEROL: 36 MG/DL
AMB EXT HEMATOCRIT: 46
AMB EXT HEMOGLOBIN: 15.1
AMB EXT HGBA1C: 6.7 %
AMB EXT LDL CHOLESTEROL, DIRECT: 92 MG/DL
AMB EXT MCV: 87
AMB EXT PLATELETS: 216
AMB EXT POSTASSIUM: 4.4 MMOL/L
AMB EXT PSA SCREEN: 0.5 NG/ML
AMB EXT SODIUM: 139 MMOL/L
AMB EXT TOTAL PROTEIN: 6.8
AMB EXT TRIGLYCERIDES: 367 MG/DL
AMB EXT VLDL: 61 MG/DL
AMB EXT WBC: 7.7 X10(3)UL

## 2025-03-03 ENCOUNTER — TELEPHONE (OUTPATIENT)
Dept: INTERNAL MEDICINE CLINIC | Facility: CLINIC | Age: 55
End: 2025-03-03

## 2025-06-04 DIAGNOSIS — M1A.0710 CHRONIC GOUT OF RIGHT FOOT, UNSPECIFIED CAUSE: ICD-10-CM

## 2025-06-04 NOTE — TELEPHONE ENCOUNTER
Name from pharmacy: COLCHICINE 0.6MG TABLETS         Will file in chart as: COLCHICINE 0.6 MG Oral Tab    Sig: TAKE 1 TABLET(0.6 MG) BY MOUTH DAILY    Disp: 90 tablet    Refills: 3 (Pharmacy requested: Not specified)    Start: 6/4/2025    Class: Normal    Non-formulary For: Chronic gout of right foot, unspecified cause    Last ordered: 11 months ago (7/2/2024) by Tara Mcrae MD    Last refill: 3/28/2025    Rx #: [id number]       To be filled at: [id number] [address] [facility] [address] AT [facility] [address] [facility], [phone], [phone]         LOV:  4/25/24  RTC: 3 months  Recent Labs: 3/1/25 outside labs    Upcoming OV  none

## 2025-06-05 RX ORDER — COLCHICINE 0.6 MG/1
0.6 TABLET ORAL DAILY
Qty: 90 TABLET | Refills: 0 | Status: SHIPPED | OUTPATIENT
Start: 2025-06-05

## (undated) DIAGNOSIS — M54.50 CHRONIC BILATERAL LOW BACK PAIN WITHOUT SCIATICA: ICD-10-CM

## (undated) DIAGNOSIS — G89.29 CHRONIC BILATERAL LOW BACK PAIN WITHOUT SCIATICA: ICD-10-CM

## (undated) DEVICE — FILTERLINE NASAL ADULT O2/CO2

## (undated) DEVICE — MEDI-VAC NON-CONDUCTIVE SUCTION TUBING: Brand: CARDINAL HEALTH

## (undated) DEVICE — Device: Brand: DEFENDO AIR/WATER/SUCTION AND BIOPSY VALVE

## (undated) DEVICE — ENDOSCOPY PACK - LOWER: Brand: MEDLINE INDUSTRIES, INC.

## (undated) DEVICE — MEDI-VAC SUCTION HANDLE REGULAR CAPACITY: Brand: CARDINAL HEALTH

## (undated) DEVICE — 1200CC GUARDIAN II: Brand: GUARDIAN

## (undated) NOTE — MR AVS SNAPSHOT
[place]  [address] [facility] [address] [address]  [address] [facility] [address] [phone]               Thank you for choosing us for your health care visit with Beatriz Harada, MD.  We are glad to serve you and happy to provide you with this s your heart and to lower your risk for high blood pressure. It can also help lower your risk for cancer, heart disease, osteoporosis, and diabetes. Choosing from each food group  Choose foods from each of the food groups below each day.  Try to get the annel · Half a cup cooked dry beans or legumes  Best choices: Dry roasted nuts with no salt added, lentils, kidney beans, garbanzo beans, and whole richard beans.    Fats and oils  Servings: 2 to 3 a day  A serving is:  · 1 teaspoon vegetable oil  · 1 teaspoon soft Commonly known as:  MOTRIN                   MyChart     Sign up for [employer], your secure online medical record. [employer] will allow you to access patient instructions from your recent visit,  view other health information, and more.  To sign up or find mor Fully enjoy your food when eating. Don’t eat while distracted and slow down. Avoid over sized portions. Don’t eat while when you’re bored.      EAT THESE FOODS MORE OFTEN: EAT THESE FOODS LESS OFTEN:   Make half your plate fruits and vegetables Highly

## (undated) NOTE — LETTER
Date: 6/14/2019    Patient Name: Rosio Denson          To Whom it may concern: The above patient was seen at the [facility] for treatment of a medical condition.     This patient should be excused from attending work from 6/13/19 throug

## (undated) NOTE — ED AVS SNAPSHOT
[facility] [address]  [address]    [address] [place] [address]    Phone:  [phone]    Fax:  [phone]           Shaunna Oliveros   MRN: [id number]    Department:  THE [facility]   Date of Visit: [facility]  [address]  [address], [address] [address]  [phone] [address]  [address]  [address] [place], [address]  [phone] [facility]  [address] [facility] [address] [facility] [address]   [phone] re-interpreted by a radiologist.  If there is a significant change in your reading, you will be contacted. Please make sure we have your correct phone number before you leave. After you leave, you should follow the attached instructions.      I have read an and ask to get set up for an insurance coverage that is in-network with [employer] [facility]. [employer]     Sign up for [employer], your secure online medical record.   [employer] will allow you to access patient instructions from your recent visit,  view

## (undated) NOTE — MR AVS SNAPSHOT
After Visit Summary   9/15/2017    Obed Nj    MRN: [id number]           Visit Information     Date & Time  9/15/2017  8:00 PM Provider  [address] Dept[address]  Phone  [phone]      Allergies as of not require immediate attention   VIDEO VISITS  Average cost  $35*    e-VISITS  Average cost  $35*      [address]  Monday - Friday  10:00 am - 10:00 pm  Saturday - Sunday  10:00 am - 4:0